# Patient Record
Sex: MALE | HISPANIC OR LATINO | Employment: OTHER | URBAN - METROPOLITAN AREA
[De-identification: names, ages, dates, MRNs, and addresses within clinical notes are randomized per-mention and may not be internally consistent; named-entity substitution may affect disease eponyms.]

---

## 2018-07-16 ENCOUNTER — HOSPITAL ENCOUNTER (INPATIENT)
Facility: CLINIC | Age: 63
LOS: 2 days | Discharge: HOME | DRG: 897 | End: 2018-07-19
Attending: EMERGENCY MEDICINE | Admitting: HOSPITALIST
Payer: COMMERCIAL

## 2018-07-16 DIAGNOSIS — I10 HYPERTENSION, UNSPECIFIED TYPE: ICD-10-CM

## 2018-07-16 DIAGNOSIS — E78.5 HYPERLIPIDEMIA, UNSPECIFIED HYPERLIPIDEMIA TYPE: Primary | ICD-10-CM

## 2018-07-16 DIAGNOSIS — F10.10 ALCOHOL ABUSE: ICD-10-CM

## 2018-07-16 DIAGNOSIS — F10.220 ALCOHOL DEPENDENCE WITH UNCOMPLICATED INTOXICATION (H): ICD-10-CM

## 2018-07-16 DIAGNOSIS — I10 BENIGN ESSENTIAL HYPERTENSION: ICD-10-CM

## 2018-07-16 LAB
ALBUMIN SERPL-MCNC: 3.4 G/DL (ref 3.4–5)
ALP SERPL-CCNC: 180 U/L (ref 40–150)
ALT SERPL W P-5'-P-CCNC: 82 U/L (ref 0–70)
ANION GAP SERPL CALCULATED.3IONS-SCNC: 7 MMOL/L (ref 3–14)
AST SERPL W P-5'-P-CCNC: 38 U/L (ref 0–45)
BASOPHILS # BLD AUTO: 0.1 10E9/L (ref 0–0.2)
BASOPHILS NFR BLD AUTO: 0.6 %
BILIRUB SERPL-MCNC: 0.2 MG/DL (ref 0.2–1.3)
BUN SERPL-MCNC: 6 MG/DL (ref 7–30)
CALCIUM SERPL-MCNC: 8.3 MG/DL (ref 8.5–10.1)
CHLORIDE SERPL-SCNC: 107 MMOL/L (ref 94–109)
CO2 SERPL-SCNC: 30 MMOL/L (ref 20–32)
CREAT SERPL-MCNC: 0.63 MG/DL (ref 0.66–1.25)
DIFFERENTIAL METHOD BLD: ABNORMAL
EOSINOPHIL # BLD AUTO: 0.4 10E9/L (ref 0–0.7)
EOSINOPHIL NFR BLD AUTO: 3.7 %
ERYTHROCYTE [DISTWIDTH] IN BLOOD BY AUTOMATED COUNT: 13.9 % (ref 10–15)
ETHANOL SERPL-MCNC: 0.25 G/DL
GFR SERPL CREATININE-BSD FRML MDRD: >90 ML/MIN/1.7M2
GLUCOSE SERPL-MCNC: 159 MG/DL (ref 70–99)
HCT VFR BLD AUTO: 44.7 % (ref 40–53)
HGB BLD-MCNC: 15.5 G/DL (ref 13.3–17.7)
IMM GRANULOCYTES # BLD: 0.1 10E9/L (ref 0–0.4)
IMM GRANULOCYTES NFR BLD: 0.5 %
INTERPRETATION ECG - MUSE: NORMAL
LYMPHOCYTES # BLD AUTO: 4.2 10E9/L (ref 0.8–5.3)
LYMPHOCYTES NFR BLD AUTO: 40.9 %
MAGNESIUM SERPL-MCNC: 2.4 MG/DL (ref 1.6–2.3)
MCH RBC QN AUTO: 33 PG (ref 26.5–33)
MCHC RBC AUTO-ENTMCNC: 34.7 G/DL (ref 31.5–36.5)
MCV RBC AUTO: 95 FL (ref 78–100)
MONOCYTES # BLD AUTO: 1.8 10E9/L (ref 0–1.3)
MONOCYTES NFR BLD AUTO: 17.2 %
NEUTROPHILS # BLD AUTO: 3.8 10E9/L (ref 1.6–8.3)
NEUTROPHILS NFR BLD AUTO: 37.1 %
NRBC # BLD AUTO: 0 10*3/UL
NRBC BLD AUTO-RTO: 0 /100
PLATELET # BLD AUTO: 186 10E9/L (ref 150–450)
POTASSIUM SERPL-SCNC: 3.5 MMOL/L (ref 3.4–5.3)
PROT SERPL-MCNC: 7.1 G/DL (ref 6.8–8.8)
RBC # BLD AUTO: 4.7 10E12/L (ref 4.4–5.9)
SODIUM SERPL-SCNC: 144 MMOL/L (ref 133–144)
TROPONIN I SERPL-MCNC: <0.015 UG/L (ref 0–0.04)
WBC # BLD AUTO: 10.3 10E9/L (ref 4–11)

## 2018-07-16 PROCEDURE — 83735 ASSAY OF MAGNESIUM: CPT | Performed by: EMERGENCY MEDICINE

## 2018-07-16 PROCEDURE — 93005 ELECTROCARDIOGRAM TRACING: CPT

## 2018-07-16 PROCEDURE — 85025 COMPLETE CBC W/AUTO DIFF WBC: CPT | Performed by: EMERGENCY MEDICINE

## 2018-07-16 PROCEDURE — 25000128 H RX IP 250 OP 636: Performed by: EMERGENCY MEDICINE

## 2018-07-16 PROCEDURE — 84484 ASSAY OF TROPONIN QUANT: CPT | Performed by: EMERGENCY MEDICINE

## 2018-07-16 PROCEDURE — 99285 EMERGENCY DEPT VISIT HI MDM: CPT

## 2018-07-16 PROCEDURE — 80053 COMPREHEN METABOLIC PANEL: CPT | Performed by: EMERGENCY MEDICINE

## 2018-07-16 PROCEDURE — 25000125 ZZHC RX 250: Performed by: EMERGENCY MEDICINE

## 2018-07-16 PROCEDURE — 80320 DRUG SCREEN QUANTALCOHOLS: CPT | Performed by: EMERGENCY MEDICINE

## 2018-07-16 RX ORDER — LORAZEPAM 2 MG/ML
1-2 INJECTION INTRAMUSCULAR
Status: ACTIVE | OUTPATIENT
Start: 2018-07-16 | End: 2018-07-17

## 2018-07-16 ASSESSMENT — ENCOUNTER SYMPTOMS
HEADACHES: 0
TREMORS: 1
SLEEP DISTURBANCE: 1
HALLUCINATIONS: 1
SHORTNESS OF BREATH: 0

## 2018-07-16 NOTE — IP AVS SNAPSHOT
MRN:[id number]                      After Visit Summary   7/16/2018    Javier Gaston    MRN: [id number]           Thank you!     Thank you for choosing [facility] for your care. Our goal is always to provide you with excellent care. Hearing back from our patients is one way we can continue to improve our services. Please take a few minutes to complete the written survey that you may receive in the mail after you visit with us. Thank you!        Patient Information     Date Of Birth          1955        Designated Caregiver       Most Recent Value    Caregiver    Will someone help with your care after discharge? yes    Name of designated caregiver Geronimo Diehl, daughter in law    Phone number of caregiver [phone]    Caregiver address Same as patient      About your hospital stay     You were admitted on:  July 17, 2018 You last received care in the:  [facility] [address] [facility]    You were discharged on:  July 19, 2018        Reason for your hospital stay       Alcohol withdraw                  Who to Call     For medical emergencies, please call 911.  For non-urgent questions about your medical care, please call your primary care provider or clinic, [phone]          Attending Provider     Provider Specialty    Alejandro Coronel MD Emergency Medicine    Ryan Junior MD Emergency Medicine    MarycruzMichael allan,  HOSPITALIST       Primary Care Provider Office Phone # Fax #    Viry Cline [phone]      After Care Instructions     Activity       Your activity upon discharge: activity as tolerated            Diet       Follow this diet upon discharge: Moderate consistent carbohydrate (9584-4956 hung / 4-6 CHO units per meal)                  Follow-up Appointments     Follow-up and recommended labs and tests        Follow up with primary care provider, Viry Cline, within 7 days to evaluate medication change, for hospital follow-  "up, regarding new diagnosis (DM type 2) and to follow up on BP and results.  The following labs/tests are recommended: glucose, liver enzymes.                  Further instructions from your care team       Patient belongings kept with patient    Appt scheduled with Dr. Cline on  at 10:20 am.  At [facility]     Pending Results     No orders found from 2018 to 2018.            Statement of Approval     Ordered          18 0854  I have reviewed and agree with all the recommendations and orders detailed in this document.  EFFECTIVE NOW     Approved and electronically signed by:  Adebayo Elizondo MD             Admission Information     Date & Time Provider Department Dept. Phone    2018 Michael Hough DO [place] [address] [facility] [phone]      Your Vitals Were     Blood Pressure Temperature Respirations Height Weight Pulse Oximetry    173/106 (BP Location: Left arm) 97.8  F (36.6  C) (Oral) 16 1.676 m (5' 6\") 79.4 kg (175 lb) 96%    BMI (Body Mass Index)                   28.25 kg/m2           [employer] Information     [employer] lets you send messages to your doctor, view your test results, renew your prescriptions, schedule appointments and more. To sign up, go to [email or web address] . Click on \"Log in\" on the left side of the screen, which will take you to the Welcome page. Then click on \"Sign up Now\" on the right side of the page.     You will be asked to enter the access code listed below, as well as some personal information. Please follow the directions to create your username and password.     Your access code is: [email or web address]  Expires: 10/17/2018  9:22 AM     Your access code will  in 90 days. If you need help or a new code, please call your [facility] or [phone].        Care EveryWhere ID     This is your Care EveryWhere ID. This could be used by other organizations to access your [facility] " medical records  [id number]        Equal Access to Services     JAE WILHELM : Hadii aad ku hadrayguillermina Schulz, walesterda luqadaha, qaybta johndebraruss hernadez, matt nodenypro hills. So [facility] [phone].    ATENCIÓN: Si habla español, tiene a rea disposición servicios gratuitos de asistencia lingüística. Llame al [phone].    We comply with applicable federal civil rights laws and [place] laws. We do not discriminate on the basis of race, color, national origin, age, disability, sex, sexual orientation, or gender identity.               Review of your medicines      START taking        Dose / Directions    folic acid 1 MG tablet   Commonly known as:  FOLVITE   Used for:  Alcohol abuse        Dose:  1 mg   Take 1 tablet (1 mg) by mouth daily   Quantity:  30 tablet   Refills:  0       lisinopril 10 MG tablet   Commonly known as:  PRINIVIL/ZESTRIL   Used for:  Benign essential hypertension        Dose:  10 mg   Take 1 tablet (10 mg) by mouth daily   Quantity:  30 tablet   Refills:  3       multivitamin, therapeutic with minerals Tabs tablet   Used for:  Alcohol abuse        Dose:  1 tablet   Take 1 tablet by mouth daily   Quantity:  30 each   Refills:  0       rosuvastatin 5 MG tablet   Commonly known as:  CRESTOR   Used for:  Hyperlipidemia, unspecified hyperlipidemia type        Dose:  5 mg   Take 1 tablet (5 mg) by mouth daily   Quantity:  30 tablet   Refills:  3       thiamine 100 MG tablet   Used for:  Alcohol abuse        Dose:  100 mg   Take 1 tablet (100 mg) by mouth daily for 3 days   Quantity:  3 tablet   Refills:  0         CONTINUE these medicines which may have CHANGED, or have new prescriptions. If we are uncertain of the size of tablets/capsules you have at home, strength may be listed as something that might have changed.        Dose / Directions    metoprolol succinate 50 MG 24 hr tablet   Commonly known as:  TOPROL-XL   This may have changed:    - medication strength  - how much  to take   Used for:  Alcohol abuse        Dose:  50 mg   Take 1 tablet (50 mg) by mouth daily   Quantity:  30 tablet   Refills:  3         CONTINUE these medicines which have NOT CHANGED        Dose / Directions    HYDROCHLOROTHIAZIDE PO        Dose:  25 mg   Take 25 mg by mouth daily   Refills:  0            Where to get your medicines      These medications were sent to [facility] [place], [place] - [address]  [address] [place] [address]     Phone:  [phone]     folic acid 1 MG tablet    lisinopril 10 MG tablet    metoprolol succinate 50 MG 24 hr tablet    multivitamin, therapeutic with minerals Tabs tablet    rosuvastatin 5 MG tablet    thiamine 100 MG tablet                Protect others around you: Learn how to safely use, store and throw away your medicines at www.disposemymeds.org.             Medication List: This is a list of all your medications and when to take them. Check marks below indicate your daily home schedule. Keep this list as a reference.      Medications           Morning Afternoon Evening Bedtime As Needed    folic acid 1 MG tablet   Commonly known as:  FOLVITE   Take 1 tablet (1 mg) by mouth daily   Last time this was given:  1 mg on 7/19/2018  9:13 AM            Tomorrow morning 7/20                       HYDROCHLOROTHIAZIDE PO   Take 25 mg by mouth daily   Last time this was given:  25 mg on 7/19/2018  9:13 AM            Tomorrow morning 7/20                       lisinopril 10 MG tablet   Commonly known as:  PRINIVIL/ZESTRIL   Take 1 tablet (10 mg) by mouth daily   Last time this was given:  10 mg on 7/19/2018  9:13 AM            Tomorrow morning 7/20                       metoprolol succinate 50 MG 24 hr tablet   Commonly known as:  TOPROL-XL   Take 1 tablet (50 mg) by mouth daily   Last time this was given:  50 mg on 7/19/2018  9:13 AM            Tomorrow morning 7/20                       multivitamin, therapeutic with minerals Tabs tablet   Take  1 tablet by mouth daily   Last time this was given:  1 tablet on 7/19/2018  9:13 AM            Tomorrow morning 7/20                       rosuvastatin 5 MG tablet   Commonly known as:  CRESTOR   Take 1 tablet (5 mg) by mouth daily   Last time this was given:  5 mg on 7/19/2018  9:13 AM            Tomorrow morning 7/20                       thiamine 100 MG tablet   Take 1 tablet (100 mg) by mouth daily for 3 days   Last time this was given:  100 mg on 7/19/2018  9:13 AM         Tomorrow morning 7/20                                 More Information        Síndrome De Abstinencia Del Alcohol [Alcohol Withdrawl]  Los síntomas de la abstinencia del alcohol aparecen si usted ha bebido con mucha frecuencia en los últimos bina y rea cuerpo se jamil acostumbrado a los efectos del alcohol. Cuando diane de beber de repente (o, si reduce la cantidad de alcohol que samuel por día, aunque continúe bebiendo), es posible que presente los síntomas de la abstinencia del alcohol, también llamados  la temblorina  (temblores navid) o  delirium tremens.   Los síntomas usuales chris de 3 a 4 días y pueden incluir nerviosismo, temblores, náuseas, sudor o insomnio (no poder dormir). En casos graves, puede arslan alucinaciones (debra cosas que no están allí) y convulsiones.  Cuidados En [place]:    En los próximos días, deberá descansar mucho y beber mucho líquido. Coma a intervalos regulares. Por supuesto, no debra más alcohol. Lo mejor tosha tunde período es que usted permanezca con rea krissy o con amigos que puedan ayudarlo y contenerlo. También puede inscribirse en algún programa residencial de desintoxicación.    No conduzca hasta que todos los síntomas hayan desaparecido y se sienta mejor.    Si le dieron algún medicamento sedante, no lo tome con mayor frecuencia que la recetada y nunca lo tome con alcohol.  Visita De Control:  Sakina vez que haya pasado por los síntomas de abstinencia, habrá luchado la mitad de la jeffery. Para evitar recaer  en rea jer manera de beber, es esencial que obtenga tratamiento, respaldo y contención tosha esta nueva etapa.  Alcohólicos Anónimos (Alcoholics Anonymous) ofrece contención a través de grupos de autoayuda. No se cobran cuotas ni honorarios. Consulte las [place] y llame para conocer el horario y el lugar donde se realizan las reuniones. www.aa.org  Al-Anon ofrece apoyo para alcohólicos y familiares de alcohólicos. [phone] www.al-anon.org  National [place] on Alcoholism and Drug Dependence (Consejo Nacional sobre Alcoholismo y Drogadependencia) [phone] www.ncadd.org  Existen también programas residenciales de desintoxicación alcohólica. Consulte las [place], en la sección  Drug Abuse & Treatment Centers  (Drogadicción y Centros de Tratamiento).  Busque Prontamente Atención Médica  si algo de lo siguiente ocurre:    Temblores navid.    Alucinaciones.    Convulsiones.    Fiebre superior a los 100.5  F (38.0  C) [temperatura oral].    Dolor de bindu, confusión, somnolencia extrema, no se lo puede despertar.    Mayor dolor en la parte superior del abdomen.    Vómito persistente, o presencia de collin en el vómito.  Date Last Reviewed: 1/11/2016 2000-2017 [employer]. [address] [place] [address], [place] [address]. Todos los derechos reservados. Esta información no pretende sustituir la atención médica profesional. Sólo rea médico puede diagnosticar y tratar un problema de vishal.                Instrucciones de tyrone: Cómo tomarse la presión arterial  La presión arterial es la fuerza que la collin ejerce al moverse desde el corazón a través de los vasos sanguíneos. Usted puede tomarse rea propia presión arterial utilizando un monitor digital. Tómese la presión tan a menudo bisi le indique el médico. Tómesela siempre de la misma forma y en el mismo brazo.    1. Relájese    Espere al menos media hora después de fumar, comer o hacer ejercicio.    Siéntese cómodamente ante alex  lilly y coloque el monitor frente a usted.    Descanse tosha unos minutos antes de continuar.    2. Envuelva el manguito    Coloque el brazo sobre la lilly con la arshad de la mano hacia arriba. Lowery brazo debe estar al nivel del corazón. Envuelva el manguito alrededor del brazo, jacque arriba del codo. Es mejor que envuelva el manguito directamente sobre la piel, sin ropa por encima.    Asegúrese de que el manguito sea del tamaño adecuado. Si no alcanza para envolverlo alrededor del brazo, pida liana más mckinley. Un manguito demasiado pequeño puede producir alex medición incorrecta de la presión arterial.    3. Infle el manguito     Bombee el manguito hasta que el medidor de presión inder 160. Si tiene un manguito autoinflable, presione el botón que pone en funcionamiento a la bomba.    El manguito se apretará y luego se aflojará.    Los números empezarán a cambiar. Cuando dejen de cambiar, aparecerá la medición de lowery presión arterial.    Si obtiene alex medición demasiado tyrone o demasiado baja con respecto a la habitual, relájese tosha unos minutos y citlalli la prueba otra vez.    4. Anote los resultados    Anote los resultados que obtiene al medirse la presión arterial. Inder la fecha y la hora y mantenga los resultados en un solo lugar, por ejemplo en un cuaderno.    Quite el manguito del brazo y desconecte la máquina.  Date Last Reviewed: 4/27/2016 2000-2017 [employer]. [address] [place] [address], [place] [address]. Todos los derechos reservados. Esta información no pretende sustituir la atención médica profesional. Sólo lowery médico puede diagnosticar y tratar un problema de vishal.                Diabetes (Información general)  La diabetes es un problema de vishal a estee plazo que significa que lowery cuerpo no produce la suficiente insulina. O también puede significar que lowery cuerpo no puede utilizar la insulina que produce. La insulina es alex hormona en lowery organismo, que permite que el azúcar en la collin  (glucosa) llegue a las células en rea cuerpo. Todas jaquan células necesitan glucosa bisi combustible.  Cuando usted tiene diabetes la glucosa en rea cuerpo se acumula porque no puede llegar hasta las células. Esta acumulación se conoce bisi un nivel alto de azúcar en la collin (hiperglucemia).  Rea nivel de azúcar en la collin depende de varias cosas. Depende de la clase de alimentos que usted come y de cuánto come. También depende de cuánto ejercicio hace y de cuánta insulina tiene en rea organismo. [place] mucho de las clases indebidas de alimentos o no hyun a tiempo los medicamentos para la diabetes puede causar con el tiempo un nivel alto de azúcar en la collin. Las infecciones pueden provocar un nivel alto de azúcar en la collin, incluso si está tomando jaquan medicamentos correctamente.  Estas cosas también pueden causar un nivel bajo de azúcar en la collin:    Saltarse las comidas    No comer suficientes alimentos    Hyun demasiado de un medicamento para la diabetes  Con el tiempo, si no se trata la diabetes puede causar problemas serios. Estos problemas incluyen enfermedades del corazón, ataque cerebral, insuficiencia renal y ceguera. También pueden incluir dolor en los nervios o pérdida de sensación en jaquan piernas o pies. Al mantener rea azúcar en la collin bajo control usted puede prevenir o retrasar estos problemas.  Los niveles normales en la collin son de 80 a 130 antes de las comidas y menos de 180 de 1 a 2 horas después de comer.    Cuidados en el hogar  Cuando se esté cuidando usted mismo en rea hogar, siga estas pautas:    Siga la dieta que le recomiende rea proveedor de atención médica.  Use la insulina o cualquier otro medicamento para la diabetes, exactamente bisi se lo indiquen.    Vigile jaquan niveles de azúcar en la collin bisi le indiquen. Lleve un registro con los resultados. [place] le ayudará a rea proveedor a cambiar jaquan medicamentos para mantener el azúcar en la collin bajo control.     Trate de alcanzar  rea peso ideal. Es posible que pueda reducir o dejar de paige medicamentos para la diabetes si come los alimentos adecuados y hace ejercicio.    No fume. Fumar hace que los efectos de la diabetes empeoren en rea circulación. Si usted fuma y tiene diabetes es mucho más probable que sufra un ataque al corazón.     Cuídese sergio jaquan pies. Si jamil perdido la sensación en jaquan pies, es posible que no patricia alex herida o alex infección. Revise jaquan pies y la gladys entre los dedos por lo menos alex vez a la semana.      Utilice rea brazalete de alerta médica o lleve alex tarjeta en rea billetera que diga que usted tiene diabetes. [place] les ayudará a los proveedores de atención médica a brindarle los cuidados adecuados si usted se enferma hasta el punto que no pueda decirles que tiene diabetes.  Plan para un día de enfermedad  Si usted contrae un resfriado, la gripe o alex infección viral, siga estos pasos:    Revise rea plan para un día de enfermedad de la diabetes y llame a rea proveedor de atención médica bisi le indicaron que lo hiciera. Es posible que deba llamar al proveedor de inmediato si:  ? Rea nivel de azúcar en la collin está por encima de 240 a pesar de estar tomando los medicamentos para la diabetes  ? Los niveles de cetonas en rea orina están por encima de lo normal o altos  ? Ha estado vomitando tosha más de 6 horas  ? Tiene dificultades para respirar  ? Tiene alex fiebre tyrone  ? Tiene fiebre tosha varios días y no mejora  ? Se siente más aturdido o somnoliento de lo usual    Siga tomando jaquan pastillas para la diabetes (medicamentos orales) incluso si ha estado vomitando y se siente muy enfermo. Llame a rea proveedor de inmediato porque es posible que necesite insulina para bajar los niveles de azúcar en la collin hasta que se recupere de rea enfermedad.      Monitoree rea insulina incluso si ha estado vomitando y se siente muy enfermo. Llame a rea proveedor de inmediato y pregunte si necesita cambiar rea dosis de insulina. [place]  dependerá de los resultados de jaquan niveles de azúcar en la collin.     Revise rea nivel de azúcar en la collin cada 2 a 4 horas, o por lo menos 4 veces al día.    Revise con frecuencia jaquan cetonas. Si está vomitando y tiene diarrea, revíselas con más frecuencia.    No se salte comidas. Trate de comer comidas pequeñas a intervalos regulares. Hágalo aunque no sienta gana de comer.    Debra agua y otros líquidos que no contengan cafeína o calorías. [place] prevendrá que se deshidrate. Si tiene náuseas o vómito, tome pequeños sorbos cada 5 minutos. Para prevenir la deshidratación trate de beber alex taza (8 onzas) de líquido cada hora mientras esté despierto.  Cuidados generales  Siempre lleve con usted alex xiomara de azúcar de acción rápida para el charisma de que tenga síntomas de un nivel bajo de azúcar en la collin (menos de 70). A las primeras señales de un nivel bajo de azúcar en la collin, coma o debra de 15 a 20 gramos de azúcar de acción rápida para elevar el nivel de azúcar en la collin. Algunos ejemplos son:      3 a 4 tabletas de glucosa. Estas pueden comprarse en la mayoría de las farmacias.    4 onzas (1/2 taza) de un refresco (que no sea de dieta)    4 onzas (1/2 taza) de cualquier jugo de fruta    8 onzas (1 taza) de leche    5 a 6 unidades de caramelos duros    1 cucharada de miel  Revise rea nivel de azúcar en la collin 15 minutos después de tratarse. Si sigue por debajo de 70, tome de 15 a 20 gramos más de azúcar de acción rápida. Vuelva a revisarse en 15 minutos. Si regresa a lo normal (70 o más), coma un bocadillo o alex comida para mantener el azúcar en la collin dentro de un nivel seguro. Si permanece bajo, llame a rea médico o vaya a alex ashok de emergencias.  Cuidado de seguimiento  Rachelle alex teresa de seguimiento con rea proveedor de atención médica, o bisi le indiquen. Para más información acerca de la diabetes, visite la página web de la Asociación Americana de la Diabetes (American Diabetes Association) en  www.diabetes.org o llame al 870-052-6896.  Cuándo buscar consejo médico  Llame a rea proveedor de atención médica de inmediato si tiene cualquiera de estos síntomas de un nivel alto de azúcar en la collin:      Orina frecuentemente    Mareos    Somnolencia    Sed    Dolor de bindu    Náuseas o vómito    Dolor abdominal    Cambios en la vista    Respiración rápida    Confusión o pérdida del conocimiento  También llame a rea proveedor de inmediato si tiene alguna de estas señales de un nivel bajo de azúcar en la collin:      Fatiga    Dolor de bindu    Temblores    Sudoración excesiva    Hambre    Se siente ansioso o inquieto    Cambios en la vista    Somnolencia    Debilidad    Confusión o pérdida del conocimiento  Llame a rea proveedor de inmediato si cualquiera de estos ocurre:    Dolor en el pecho o falta de aire    Mareos o desmayos    Debilidad en un brazo o alex pierna, o en un lado de la sergey    Dificultad para hablar o para debra   Date Last Reviewed: 6/1/2016 2000-2017 [employer]. [address] [place] [id number]. Todos los derechos reservados. Esta información no pretende sustituir la atención médica profesional. Sólo rea médico puede diagnosticar y tratar un problema de vishal.

## 2018-07-16 NOTE — IP AVS SNAPSHOT
[facility] [address] [facility]    [address] [place] [address]    Phone:  [phone]                                       After Visit Summary   7/16/2018    Javier Gaston    MRN: [id number]           After Visit Summary Signature Page     I have received my discharge instructions, and my questions have been answered. I have discussed any challenges I see with this plan with the nurse or doctor.    ..........................................................................................................................................  Patient/Patient Representative Signature      ..........................................................................................................................................  Patient Representative Print Name and Relationship to Patient    ..................................................               ................................................  Date                                            Time    ..........................................................................................................................................  Reviewed by Signature/Title    ...................................................              ..............................................  Date                                                            Time

## 2018-07-17 PROBLEM — F10.939 ALCOHOL WITHDRAWAL (H): Status: ACTIVE | Noted: 2018-07-17

## 2018-07-17 LAB
ALBUMIN UR-MCNC: NEGATIVE MG/DL
AMPHETAMINES UR QL SCN: NEGATIVE
APPEARANCE UR: CLEAR
BARBITURATES UR QL: NEGATIVE
BENZODIAZ UR QL: NEGATIVE
BILIRUB UR QL STRIP: NEGATIVE
CANNABINOIDS UR QL SCN: NEGATIVE
COCAINE UR QL: NEGATIVE
COLOR UR AUTO: NORMAL
GLUCOSE UR STRIP-MCNC: NEGATIVE MG/DL
HGB UR QL STRIP: NEGATIVE
KETONES UR STRIP-MCNC: NEGATIVE MG/DL
LEUKOCYTE ESTERASE UR QL STRIP: NEGATIVE
NITRATE UR QL: NEGATIVE
OPIATES UR QL SCN: NEGATIVE
PCP UR QL SCN: NEGATIVE
PH UR STRIP: 6.5 PH (ref 5–7)
RBC #/AREA URNS AUTO: <1 /HPF (ref 0–2)
SOURCE: NORMAL
SP GR UR STRIP: 1 (ref 1–1.03)
UROBILINOGEN UR STRIP-MCNC: NORMAL MG/DL (ref 0–2)
WBC #/AREA URNS AUTO: <1 /HPF (ref 0–5)

## 2018-07-17 PROCEDURE — 12000000 ZZH R&B MED SURG/OB

## 2018-07-17 PROCEDURE — HZ2ZZZZ DETOXIFICATION SERVICES FOR SUBSTANCE ABUSE TREATMENT: ICD-10-PCS | Performed by: HOSPITALIST

## 2018-07-17 PROCEDURE — 25000132 ZZH RX MED GY IP 250 OP 250 PS 637: Performed by: HOSPITALIST

## 2018-07-17 PROCEDURE — 81001 URINALYSIS AUTO W/SCOPE: CPT | Performed by: EMERGENCY MEDICINE

## 2018-07-17 PROCEDURE — 80307 DRUG TEST PRSMV CHEM ANLYZR: CPT | Performed by: EMERGENCY MEDICINE

## 2018-07-17 PROCEDURE — 99222 1ST HOSP IP/OBS MODERATE 55: CPT | Mod: AI | Performed by: HOSPITALIST

## 2018-07-17 PROCEDURE — 99207 ZZC APP CREDIT; MD BILLING SHARED VISIT: CPT | Performed by: HOSPITALIST

## 2018-07-17 PROCEDURE — 25000128 H RX IP 250 OP 636: Performed by: HOSPITALIST

## 2018-07-17 PROCEDURE — 99222 1ST HOSP IP/OBS MODERATE 55: CPT | Performed by: PSYCHIATRY & NEUROLOGY

## 2018-07-17 RX ORDER — POTASSIUM CL/LIDO/0.9 % NACL 10MEQ/0.1L
10 INTRAVENOUS SOLUTION, PIGGYBACK (ML) INTRAVENOUS
Status: DISCONTINUED | OUTPATIENT
Start: 2018-07-17 | End: 2018-07-19

## 2018-07-17 RX ORDER — SODIUM CHLORIDE 9 MG/ML
INJECTION, SOLUTION INTRAVENOUS CONTINUOUS
Status: DISCONTINUED | OUTPATIENT
Start: 2018-07-17 | End: 2018-07-18

## 2018-07-17 RX ORDER — LANOLIN ALCOHOL/MO/W.PET/CERES
100 CREAM (GRAM) TOPICAL DAILY
Status: DISCONTINUED | OUTPATIENT
Start: 2018-07-17 | End: 2018-07-19 | Stop reason: HOSPADM

## 2018-07-17 RX ORDER — HYDROCHLOROTHIAZIDE 25 MG/1
25 TABLET ORAL DAILY
Status: DISCONTINUED | OUTPATIENT
Start: 2018-07-18 | End: 2018-07-17

## 2018-07-17 RX ORDER — METOPROLOL SUCCINATE 25 MG/1
25 TABLET, EXTENDED RELEASE ORAL DAILY
Status: DISCONTINUED | OUTPATIENT
Start: 2018-07-17 | End: 2018-07-18

## 2018-07-17 RX ORDER — POTASSIUM CHLORIDE 7.45 MG/ML
10 INJECTION INTRAVENOUS
Status: DISCONTINUED | OUTPATIENT
Start: 2018-07-17 | End: 2018-07-19

## 2018-07-17 RX ORDER — HYDRALAZINE HYDROCHLORIDE 20 MG/ML
10 INJECTION INTRAMUSCULAR; INTRAVENOUS EVERY 4 HOURS PRN
Status: DISCONTINUED | OUTPATIENT
Start: 2018-07-17 | End: 2018-07-19

## 2018-07-17 RX ORDER — ROSUVASTATIN CALCIUM 5 MG/1
5 TABLET, COATED ORAL DAILY
Status: DISCONTINUED | OUTPATIENT
Start: 2018-07-17 | End: 2018-07-19 | Stop reason: HOSPADM

## 2018-07-17 RX ORDER — MAGNESIUM SULFATE HEPTAHYDRATE 40 MG/ML
4 INJECTION, SOLUTION INTRAVENOUS EVERY 4 HOURS PRN
Status: DISCONTINUED | OUTPATIENT
Start: 2018-07-17 | End: 2018-07-19

## 2018-07-17 RX ORDER — POTASSIUM CHLORIDE 1500 MG/1
20-40 TABLET, EXTENDED RELEASE ORAL
Status: DISCONTINUED | OUTPATIENT
Start: 2018-07-17 | End: 2018-07-19

## 2018-07-17 RX ORDER — AMOXICILLIN 250 MG
1 CAPSULE ORAL 2 TIMES DAILY
Status: DISCONTINUED | OUTPATIENT
Start: 2018-07-17 | End: 2018-07-19

## 2018-07-17 RX ORDER — ACETAMINOPHEN 325 MG/1
650 TABLET ORAL EVERY 4 HOURS PRN
Status: DISCONTINUED | OUTPATIENT
Start: 2018-07-17 | End: 2018-07-19

## 2018-07-17 RX ORDER — POTASSIUM CHLORIDE 29.8 MG/ML
20 INJECTION INTRAVENOUS
Status: DISCONTINUED | OUTPATIENT
Start: 2018-07-17 | End: 2018-07-19

## 2018-07-17 RX ORDER — HYDROCHLOROTHIAZIDE 25 MG/1
25 TABLET ORAL DAILY
Status: DISCONTINUED | OUTPATIENT
Start: 2018-07-17 | End: 2018-07-19 | Stop reason: HOSPADM

## 2018-07-17 RX ORDER — MULTIPLE VITAMINS W/ MINERALS TAB 9MG-400MCG
1 TAB ORAL DAILY
Status: DISCONTINUED | OUTPATIENT
Start: 2018-07-17 | End: 2018-07-19 | Stop reason: HOSPADM

## 2018-07-17 RX ORDER — POTASSIUM CHLORIDE 1.5 G/1.58G
20-40 POWDER, FOR SOLUTION ORAL
Status: DISCONTINUED | OUTPATIENT
Start: 2018-07-17 | End: 2018-07-19

## 2018-07-17 RX ORDER — NALOXONE HYDROCHLORIDE 0.4 MG/ML
.1-.4 INJECTION, SOLUTION INTRAMUSCULAR; INTRAVENOUS; SUBCUTANEOUS
Status: DISCONTINUED | OUTPATIENT
Start: 2018-07-17 | End: 2018-07-19

## 2018-07-17 RX ORDER — AMOXICILLIN 250 MG
2 CAPSULE ORAL 2 TIMES DAILY
Status: DISCONTINUED | OUTPATIENT
Start: 2018-07-17 | End: 2018-07-19

## 2018-07-17 RX ORDER — DIAZEPAM 5 MG
10 TABLET ORAL EVERY 30 MIN PRN
Status: DISCONTINUED | OUTPATIENT
Start: 2018-07-17 | End: 2018-07-19

## 2018-07-17 RX ORDER — FOLIC ACID 1 MG/1
1 TABLET ORAL DAILY
Status: DISCONTINUED | OUTPATIENT
Start: 2018-07-17 | End: 2018-07-19 | Stop reason: HOSPADM

## 2018-07-17 RX ORDER — DIAZEPAM 10 MG/2ML
5-10 INJECTION, SOLUTION INTRAMUSCULAR; INTRAVENOUS EVERY 30 MIN PRN
Status: DISCONTINUED | OUTPATIENT
Start: 2018-07-17 | End: 2018-07-19

## 2018-07-17 RX ADMIN — SODIUM CHLORIDE: 9 INJECTION, SOLUTION INTRAVENOUS at 06:44

## 2018-07-17 RX ADMIN — Medication 100 MG: at 09:12

## 2018-07-17 RX ADMIN — METOPROLOL SUCCINATE 25 MG: 25 TABLET, EXTENDED RELEASE ORAL at 09:12

## 2018-07-17 RX ADMIN — HYDROCHLOROTHIAZIDE 25 MG: 25 TABLET ORAL at 09:11

## 2018-07-17 RX ADMIN — ROSUVASTATIN CALCIUM 5 MG: 5 TABLET, FILM COATED ORAL at 09:12

## 2018-07-17 RX ADMIN — SODIUM CHLORIDE: 9 INJECTION, SOLUTION INTRAVENOUS at 16:42

## 2018-07-17 RX ADMIN — MULTIPLE VITAMINS W/ MINERALS TAB 1 TABLET: TAB at 09:12

## 2018-07-17 RX ADMIN — FOLIC ACID 1 MG: 1 TABLET ORAL at 09:12

## 2018-07-17 ASSESSMENT — ACTIVITIES OF DAILY LIVING (ADL)
ADLS_ACUITY_SCORE: 11

## 2018-07-17 NOTE — PLAN OF CARE
Problem: Alcohol Withdrawal Acute, Risk/Actual (Adult)  Goal: Signs and Symptoms of Listed Potential Problems Will be Absent, Minimized or Managed (Alcohol Withdrawal Acute, Risk/Actual)  Signs and symptoms of listed potential problems will be absent, minimized or managed by discharge/transition of care (reference Alcohol Withdrawal Acute, Risk/Actual (Adult) CPG).   Outcome: No Change  Pt is A&Ox4, Greek speaking, Daughter at bedside,  here earlier for psych eval. VSS on RA, denied pain, Hypertensive, MD aware, Restarted PTA medications although last /101. Up with SBA. LS clear, adequate UOP. CIWA 0/0. Poor appetite, denied N/V. IVF infusing at 100ml/hr. D/c 1-2 days.

## 2018-07-17 NOTE — ED PROVIDER NOTES
History     Chief Complaint:  Alcohol Intoxication and Hypertension    The history is provided by the patient, a relative and medical records.      Javier Gaston is a 63 year old male with hypercholesteremia and a history of alcohol abuse who presents to the emergency department today for evaluation of alcohol intoxication and hypertension. The patient was in clinic 4 days ago for tests. Per the family, the patient was called by his nurse today and told that his blood pressure and cholesterol was running high, he has not been taking his medication and has been drinking a lot the past three days. He denies any need for help with his alcohol use, but his family states that he does need it. He reports that he gets tremors and hallucinations of people and animals attacking him after getting sober after a binge, and that he loses sleep secondary to this. He denies any pain, trouble breathing, headache, and vision changes. The patient last had an alcoholic drink at 2100 this evening.     Allergies:  Tree Nuts [Nuts]    Medications:    ADVIL OR  NAPROSYN 500 MG OR TABS  SKELAXIN 800 MG OR TABS  VICODIN 5-500 MG OR TABS    Past Medical History:    Hypercholesteremia    Past Surgical History:    History reviewed. No pertinent surgical history.     Family History:    History reviewed. No pertinent family history.    Social History:  The patient was accompanied to the ED by his daughter.  Smoking Status: Current Smoker  Smokeless Tobacco: Never Used  Alcohol Use: Positive   Marital Status:       Review of Systems   Eyes: Negative for visual disturbance.   Respiratory: Negative for shortness of breath.    Neurological: Positive for tremors. Negative for headaches.   Psychiatric/Behavioral: Positive for hallucinations and sleep disturbance.   All other systems reviewed and are negative.    Physical Exam     Patient Vitals for the past 24 hrs:   BP Temp Temp src Heart Rate Resp SpO2 Height Weight  "  07/16/18 2345 (!) 174/100 - - - - 95 % - -   07/16/18 2315 (!) 149/94 - - - - 95 % - -   07/16/18 2300 (!) 157/107 - - - - 97 % - -   07/16/18 2230 (!) 164/100 - - - - 96 % - -   07/16/18 2215 155/90 - - - - 96 % - -   07/16/18 2109 (!) 169/92 98.1  F (36.7  C) Oral 94 16 96 % 1.676 m (5' 6\") 79.4 kg (175 lb)      Physical Exam    Constitutional:  Appears well-developed and well-nourished. Cooperative.   Smells of alcohol, pleasant, appears mildly intoxicated, somewhat anxious.  HENT:   Head:    Atraumatic.   Mouth/Throat:   Oropharynx is without erythema or exudate and mucous     membranes are moist.   Mild tremor when protruding tongue.  Eyes:    Conjunctivae normal and EOM are normal.      Pupils are equal, round, and reactive to light.   Neck:    Normal range of motion. Neck supple.   Cardiovascular:  Normal rate, regular rhythm, normal heart sounds and radial and    dorsalis pedis pulses are 2+ and symmetric.    Pulmonary/Chest:  Effort normal and breath sounds normal.   Abdominal:   Soft. Bowel sounds are normal.      No splenomegaly or hepatomegaly. No tenderness. No rebound.   Musculoskeletal:  Normal range of motion. No edema and no tenderness.   Neurological:  Alert. Normal strength. No cranial nerve deficit. GCS 15.  Mild tremor with activity in both hands.  Skin:    Skin is warm and dry.   Psychiatric:   Normal mood and affect.     Emergency Department Course     ECG:  ECG taken at 2224, ECG read at 2250  Normal sinus rhythm  Nonspecific ST abnormality  Abnormal ECG  Rate slowed and now with NST wave changes since prior ECG on 12/31/10  Rate 85 bpm. IN interval 152 ms. QRS duration 98 ms. QT/QTc 374/445 ms. P-R-T axes 77 19 6.    Laboratory:  Laboratory findings were communicated with the patient who voiced understanding of the findings.    UA: Negative  Drug abuse: Negative  CBC: WBC 10.3, HGB 15.5,   CMP: Glucose 159, BUN 6, Creatinine 0.63, Calcium 8.3, AlkPhos 180, ALT 82  Troponin I: " <0.015  ETOH: 0.25  Magnesium: 2.4    Interventions:  2327 NS, 1 L with Infuvite 10 mL, thiamine 100 mg, and folic acid 1 mg, IV     Emergency Department Course:    2205 Nursing notes and vitals reviewed.    2210 I performed an exam of the patient as documented above.     2252 IV was inserted and blood was drawn for laboratory testing, results above.     0010 I personally reviewed the lab results with the patient and answered all related questions prior to admission.    0040 The patient provided a urine sample here in the emergency department. This was sent for laboratory testing, findings above.     0120 Patient was rechecked and updated.     Impression & Plan      Medical Decision Making:  Javier Gaston is a 63 year old male who presents for evaluation of alcohol abuse and intoxication.  They are intoxicated here in ED by lab evaluation.   Patient has a self reported history of Delirium tremens and hallucinations with alcohol withdrawal. There are no signs of co-ingestion including acetaminophen, drugs, medications, volatile alcohols.  There are no signs of trauma related to alcohol use and no further workup is needed including head CT. Thiamine/folate/multivitamin given.  There are no detox beds available in the city.  Based on history, the patient is at risk for significant withdrawal.  I recommended inpatient detox, and the patient and his family are agreeable to this.    The patient also has recently been evaluated by his primary care physician.  They alerted him that his cholesterol is elevated, and he needs to begin treatment.  Patient also has had poorly controlled hypertension.  He is asymptomatic with his elevated pressures here in the ED. There is no headache and no neurologic findings.  No indication for head CT.  There is no sign of endorgan damage another testing.  Blood pressures improved somewhat with rest and hydration in the ED. I discussed the case with hospitalist who agreed to  accept for admission.  Patient will be admitted for further treatment.    Diagnosis:    ICD-10-CM    1. Alcohol abuse F10.10 UA with Microscopic     Drug abuse screen 77 urine ([place], [place], )   2. Alcohol dependence with uncomplicated intoxication (H) F10.220    3. Hypertension, unspecified type I10      Disposition:   Admission    Scribe Disclosure:  Tavares DANIEL, am serving as a scribe at 10:02 PM on 7/16/2018 to document services personally performed by Ryan Junior MD based on my observations and the provider's statements to me.       EMERGENCY DEPARTMENT       Ryan Junior MD  07/17/18 9863

## 2018-07-17 NOTE — PROGRESS NOTES
Admission    Patient arrives to room 614-1 via cart from ED.  Care plan note: Yes    Inpatient nursing criteria listed below were met:    PCD's Documented: Yes  Skin issues/needs documented :No  Isolation education started/completed NA  Patient allergies verified with patient: No  Verified completion of [employer] Risk Assessment Tool:  Yes  Verified completion of Guardianship screening tool: Yes  Fall Prevention: Care plan updated, Education given and documented Yes  Care Plan initiated: Yes  Home medications documented in belongings flowsheet: NA  Patient belongings documented in belongings flowsheet: Yes  Reminder note (belongings/ medications) placed in discharge instructions:Yes  Admission profile/ required documentation complete: Yes

## 2018-07-17 NOTE — PHARMACY-ADMISSION MEDICATION HISTORY
Admission medication history interview status for the 7/16/2018  admission is complete. See EPIC admission navigator for prior to admission medications     Medication history source reliability:Good    Actions taken by pharmacist (provider contacted, etc):Spoke to patient and daughter regarding current medications and history. Daughter has photos of medication bottles on her phone.     Additional medication history information not noted on PTA med list :Pt was recently prescribed cholesterol medication (CareEverywhere shows this was rosuvastatin 5mg HS) but patient does not take it due to cost of medication. She also states he should probably be taking an aspirin as suggested by his provider but he is not taking this. Daughter also states patient used to take OTC meds for seasonal allergies.     Medication reconciliation/reorder completed by provider prior to medication history? N/A    Time spent in this activity: 15 minutes    Prior to Admission medications    Medication Sig Last Dose Taking? Auth Provider   HYDROCHLOROTHIAZIDE PO Take 25 mg by mouth daily Past Week at Unknown time Yes Unknown, Entered By History   METOPROLOL SUCCINATE ER PO Take 25 mg by mouth daily Past Week at Unknown time Yes Unknown, Entered By History

## 2018-07-17 NOTE — PROGRESS NOTES
Brief no charge note.    Patient admitted by my colleague Dr Hough early this am.    Javier Gaston is a 63 year old male with hypertension, dyslipidemia and alcohol abuse who was brought by family for concerns for alcohol withdrawal     O/E, still with gross tremors, slightly anxious    # ETOH abuse with alcohol withdrawal  - Still with tremors; evaluated by psychiatry, declines chemical dependency evaluation  - On Valium with CIWA protocol  - Continue thiamine and multivitamin  - IV hydration with normal saline at 100ml /hr    # HTN  - BP in 160s; resumed prior to admission HCTZ and metoprolol; hydralazine p.r.n. for systolic blood pressure more than 180    # Dyslipidemia  - Continue PT simvastatin    Dispo: likely in 1-2 days when medically stable with no concerns for withdrawal

## 2018-07-17 NOTE — PLAN OF CARE
Problem: Patient Care Overview  Goal: Plan of Care/Patient Progress Review  Outcome: No Change  A&O x4, speacks Romanian and dtr at bedside translating.  scheduled for 0937-4741. VSS on RA; ex /100. SBA. CIWA 0 and 2 for anxiousness. Last ETOH drink 2100 7/16. Pt has Hx of ETOH withdrawals with tremors, anxiety and hallucinations. PIV SL. Cont pulse ox on. Ref PCD. CD consulted.

## 2018-07-17 NOTE — PROGRESS NOTES
RECEIVING UNIT ED HANDOFF REVIEW    ED Nurse Handoff Report was reviewed by: Lis Conklin on July 17, 2018 at 2:57 AM

## 2018-07-17 NOTE — CONSULTS
Pt seen for initial psychiatric evaluation, please see my dictation for details and recommendations. Patient denies psychiatric symptoms. Admits he drinks heavily and experiences delirium but he is not willing to pursue CD treatment or MAT. Family present at time of assessment and agree that it is his choice.

## 2018-07-17 NOTE — ED NOTES
[facility]  ED Nurse Handoff Report    ED Chief complaint: Alcohol Intoxication (per family, pt was called by nurse today and told that he has high blood pressure, but pt has not been taking newly prescribed medications. per family pt has also been heavliy drinking for the past 3 days, which is unusual for him.) and Hypertension      ED Diagnosis:   Final diagnoses:   Alcohol abuse   Alcohol dependence with uncomplicated intoxication (H)   Hypertension, unspecified type       Code Status: Full Code    Allergies:   Allergies   Allergen Reactions     Tree Nuts [Nuts]        Activity level - Baseline/Home:  Independent    Activity Level - Current:   Stand with Assist     Needed?: Yes    Isolation: No  Infection: Not Applicable  Bariatric?: No    Vital Signs:   Vitals:    07/16/18 2230 07/16/18 2300 07/16/18 2315 07/16/18 2345   BP: (!) 164/100 (!) 157/107 (!) 149/94 (!) 174/100   Resp:       Temp:       TempSrc:       SpO2: 96% 97% 95% 95%   Weight:       Height:           Cardiac Rhythm: ,        Pain level: 0-10 Pain Scale: 0    Is this patient confused?: No   [place]   Suicide Severity Rating Scale Completed?  Yes  If yes, what color did the patient score?  White    Patient Report: Initial Complaint: Pt. Brought to ER by family with concerns that pt. Has not been taking blood pressure meds and concurrently drinking heavily. Tonight pt. Ingested 12 pack of beer.   Focused Assessment: Pt. Alert and follows commands, pleasant, unstable on feet. Denies CP or SOB.   Tests Performed: Labs  Abnormal Results:   Results for orders placed or performed during the hospital encounter of 07/16/18 (from the past 24 hour(s))   CBC with platelets differential   Result Value Ref Range    WBC 10.3 4.0 - 11.0 10e9/L    RBC Count 4.70 4.4 - 5.9 10e12/L    Hemoglobin 15.5 13.3 - 17.7 g/dL    Hematocrit 44.7 40.0 - 53.0 %    MCV 95 78 - 100 fl    MCH 33.0 26.5 - 33.0 pg    MCHC 34.7 31.5 - 36.5 g/dL    RDW  13.9 10.0 - 15.0 %    Platelet Count 186 150 - 450 10e9/L    Diff Method Automated Method     % Neutrophils 37.1 %    % Lymphocytes 40.9 %    % Monocytes 17.2 %    % Eosinophils 3.7 %    % Basophils 0.6 %    % Immature Granulocytes 0.5 %    Nucleated RBCs 0 0 /100    Absolute Neutrophil 3.8 1.6 - 8.3 10e9/L    Absolute Lymphocytes 4.2 0.8 - 5.3 10e9/L    Absolute Monocytes 1.8 (H) 0.0 - 1.3 10e9/L    Absolute Eosinophils 0.4 0.0 - 0.7 10e9/L    Absolute Basophils 0.1 0.0 - 0.2 10e9/L    Abs Immature Granulocytes 0.1 0 - 0.4 10e9/L    Absolute Nucleated RBC 0.0    Comprehensive metabolic panel   Result Value Ref Range    Sodium 144 133 - 144 mmol/L    Potassium 3.5 3.4 - 5.3 mmol/L    Chloride 107 94 - 109 mmol/L    Carbon Dioxide 30 20 - 32 mmol/L    Anion Gap 7 3 - 14 mmol/L    Glucose 159 (H) 70 - 99 mg/dL    Urea Nitrogen 6 (L) 7 - 30 mg/dL    Creatinine 0.63 (L) 0.66 - 1.25 mg/dL    GFR Estimate >90 >60 mL/min/1.7m2    GFR Estimate If Black >90 >60 mL/min/1.7m2    Calcium 8.3 (L) 8.5 - 10.1 mg/dL    Bilirubin Total 0.2 0.2 - 1.3 mg/dL    Albumin 3.4 3.4 - 5.0 g/dL    Protein Total 7.1 6.8 - 8.8 g/dL    Alkaline Phosphatase 180 (H) 40 - 150 U/L    ALT 82 (H) 0 - 70 U/L    AST 38 0 - 45 U/L   Troponin I   Result Value Ref Range    Troponin I ES <0.015 0.000 - 0.045 ug/L   Alcohol ethyl   Result Value Ref Range    Ethanol g/dL 0.25 (H) <0.01 g/dL   Magnesium   Result Value Ref Range    Magnesium 2.4 (H) 1.6 - 2.3 mg/dL   EKG 12-lead, tracing only   Result Value Ref Range    Interpretation ECG Click View Image link to view waveform and result      Treatments provided: IV multi Vit. Bag=1000cc infused.     Family Comments: Family present    OBS brochure/video discussed/provided to patient: N/A    ED Medications:   Medications   LORazepam (ATIVAN) injection 1-2 mg (not administered)   sodium chloride 0.9 % 1,000 mL with INFUVITE 10 mL, thiamine 100 mg, folic acid 1 mg infusion ( Intravenous New Bag 7/16/18 3575)        Drips infusing?:  No    For the majority of the shift this patient was Green.   Interventions performed were None.    Severe Sepsis OR Septic Shock Diagnosis Present: No      ED NURSE PHONE NUMBER: [phone]

## 2018-07-17 NOTE — H&P
[facility]    History and Physical  Hospitalist       Date of Admission:  7/16/2018    Assessment & Plan   Javier Gaston is a 63 year old male who presents with alcohol withdrawal    Alcohol withdrawal  - CIWA with valium and vitamins  - chem dep  - seizure and aspiration precautions      HTN  - continue the HCTZ and treat withdrawal    Hepatitis, likley alcoholic  - trend, if increasing broaden workup    HLD  - continue his home simvastatin      DVT Prophylaxis: Pneumatic Compression Devices  Code Status: Full Code    Disposition: Expected discharge in 2-3 days once withdrawal abates.    Michael Hough DO    Primary Care Physician   Viry Cline    Chief Complaint   intoxication    History is obtained from the patient and family with help from     History of Present Illness   Javier Gaston is a 63 year old male who presents with etoh intoxication and hypertension. Basically he saw PCP a few days ago and was sent to the lab, called today with results that his cholestol was high and needed to make an appointment. His family took this opportunity to take him to the ED, expressing their desire that he sober up. Apparently he drinks very heavily, especially lately because of his birthday, and has shaking and hallucinations whenever he decides to stop drinking. They are concerning.    The patient does not think this is a problem he states, but he is agreeable to staying per his family wishes. No desire to self harm.    Past Medical History    I have reviewed this patient's medical history and updated it with pertinent information if needed.   Past Medical History:   Diagnosis Date     Alcohol abuse      Hypercholesteremia      Hypertension        Past Surgical History   I have reviewed this patient's surgical history and updated it with pertinent information if needed.  Past Surgical History:   Procedure Laterality Date     ORTHOPEDIC SURGERY  11 years ago     right knee       Prior to Admission Medications   Prior to Admission Medications   Prescriptions Last Dose Informant Patient Reported? Taking?   ADVIL OR   Yes No   Si CAPSULE EVERY 4 TO 6 HOURS AS NEEDED   NAPROSYN 500 MG OR TABS   No No   Sig: ONE TABLET TWICE DAILY WITH FOOD   SKELAXIN 800 MG OR TABS   No No   Si TABLET 3 TIMES DAILY   UNKNOWN TO PATIENT   Yes No   Sig: Cholesterol Medication    VICODIN 5-500 MG OR TABS   No No   Sig: ONE TO TWO TABLETS EVERY 4 TO 6 HOURS AS NEEDED FOR PAIN      Facility-Administered Medications: None     Allergies   Allergies   Allergen Reactions     Tree Nuts [Nuts]        Social History   I have reviewed this patient's social history and updated it with pertinent information if needed. Jaison Marilin  reports that he has been smoking.  He has never used smokeless tobacco. He reports that he drinks alcohol. He reports that he does not use illicit drugs.    Family History   I have reviewed this patient's family history and updated it with pertinent information if needed.   No family history on file.    Review of Systems   The 10 point Review of Systems is negative other than noted in the HPI or here.     Physical Exam   Temp: 98.1  F (36.7  C) Temp src: Oral BP: (!) 174/100   Heart Rate: 94 Resp: 16 SpO2: 95 % O2 Device: None (Room air)    Vital Signs with Ranges  Temp:  [98.1  F (36.7  C)] 98.1  F (36.7  C)  Heart Rate:  [94] 94  Resp:  [16] 16  BP: (149-174)/() 174/100  SpO2:  [95 %-97 %] 95 %  175 lbs 0 oz    Constitutional: Awake, alert, cooperative, no apparent distress.  Eyes: Conjunctiva and pupils examined and normal.  HEENT: Moist mucous membranes, normal dentition.  Respiratory: Clear to auscultation bilaterally, no crackles or wheezing.  Cardiovascular: Regular rate and rhythm, normal S1 and S2, and no murmur noted.  GI: Soft, non-distended, non-tender, normal bowel sounds.  Lymph/Hematologic: No anterior cervical or supraclavicular  adenopathy.  Skin: No rashes, no cyanosis, no edema.  Musculoskeletal: No joint swelling, erythema or tenderness.  Neurologic: Cranial nerves 2-12 intact, normal strength and sensation. Tremor of the bilateral legs and arms present. No asterixis. Unsteady upon standing  Psychiatric: Alert, oriented to person, place and time, no obvious anxiety or depression.    Data   Data reviewed today:  I personally reviewed the EKG tracing showing NSR with nonspecific ST signs.    Recent Labs  Lab 07/16/18  2145   WBC 10.3   HGB 15.5   MCV 95         POTASSIUM 3.5   CHLORIDE 107   CO2 30   BUN 6*   CR 0.63*   ANIONGAP 7   ULISES 8.3*   *   ALBUMIN 3.4   PROTTOTAL 7.1   BILITOTAL 0.2   ALKPHOS 180*   ALT 82*   AST 38   TROPI <0.015       Imaging:  No results found for this or any previous visit (from the past 24 hour(s)).

## 2018-07-18 ENCOUNTER — APPOINTMENT (OUTPATIENT)
Dept: ULTRASOUND IMAGING | Facility: CLINIC | Age: 63
DRG: 897 | End: 2018-07-18
Attending: INTERNAL MEDICINE
Payer: COMMERCIAL

## 2018-07-18 LAB
ALBUMIN SERPL-MCNC: 3.4 G/DL (ref 3.4–5)
ALP SERPL-CCNC: 170 U/L (ref 40–150)
ALT SERPL W P-5'-P-CCNC: 63 U/L (ref 0–70)
ANION GAP SERPL CALCULATED.3IONS-SCNC: 9 MMOL/L (ref 3–14)
AST SERPL W P-5'-P-CCNC: 32 U/L (ref 0–45)
BILIRUB SERPL-MCNC: 1 MG/DL (ref 0.2–1.3)
BUN SERPL-MCNC: 7 MG/DL (ref 7–30)
CALCIUM SERPL-MCNC: 8.4 MG/DL (ref 8.5–10.1)
CHLORIDE SERPL-SCNC: 102 MMOL/L (ref 94–109)
CO2 SERPL-SCNC: 27 MMOL/L (ref 20–32)
CREAT SERPL-MCNC: 0.58 MG/DL (ref 0.66–1.25)
GFR SERPL CREATININE-BSD FRML MDRD: >90 ML/MIN/1.7M2
GLUCOSE BLDC GLUCOMTR-MCNC: 138 MG/DL (ref 70–99)
GLUCOSE SERPL-MCNC: 145 MG/DL (ref 70–99)
HBA1C MFR BLD: 7.1 % (ref 0–5.6)
POTASSIUM SERPL-SCNC: 3.3 MMOL/L (ref 3.4–5.3)
POTASSIUM SERPL-SCNC: 3.8 MMOL/L (ref 3.4–5.3)
PROT SERPL-MCNC: 7.2 G/DL (ref 6.8–8.8)
SODIUM SERPL-SCNC: 138 MMOL/L (ref 133–144)

## 2018-07-18 PROCEDURE — 80053 COMPREHEN METABOLIC PANEL: CPT | Performed by: HOSPITALIST

## 2018-07-18 PROCEDURE — 76705 ECHO EXAM OF ABDOMEN: CPT

## 2018-07-18 PROCEDURE — 83036 HEMOGLOBIN GLYCOSYLATED A1C: CPT | Performed by: HOSPITALIST

## 2018-07-18 PROCEDURE — 84132 ASSAY OF SERUM POTASSIUM: CPT | Performed by: INTERNAL MEDICINE

## 2018-07-18 PROCEDURE — 25000132 ZZH RX MED GY IP 250 OP 250 PS 637: Performed by: HOSPITALIST

## 2018-07-18 PROCEDURE — 00000146 ZZHCL STATISTIC GLUCOSE BY METER IP

## 2018-07-18 PROCEDURE — 25000128 H RX IP 250 OP 636: Performed by: HOSPITALIST

## 2018-07-18 PROCEDURE — 25000132 ZZH RX MED GY IP 250 OP 250 PS 637: Performed by: INTERNAL MEDICINE

## 2018-07-18 PROCEDURE — 99232 SBSQ HOSP IP/OBS MODERATE 35: CPT | Performed by: INTERNAL MEDICINE

## 2018-07-18 PROCEDURE — 36415 COLL VENOUS BLD VENIPUNCTURE: CPT | Performed by: INTERNAL MEDICINE

## 2018-07-18 PROCEDURE — 36415 COLL VENOUS BLD VENIPUNCTURE: CPT | Performed by: HOSPITALIST

## 2018-07-18 PROCEDURE — 12000000 ZZH R&B MED SURG/OB

## 2018-07-18 RX ORDER — METOPROLOL SUCCINATE 50 MG/1
50 TABLET, EXTENDED RELEASE ORAL DAILY
Status: DISCONTINUED | OUTPATIENT
Start: 2018-07-18 | End: 2018-07-19 | Stop reason: HOSPADM

## 2018-07-18 RX ADMIN — FOLIC ACID 1 MG: 1 TABLET ORAL at 10:22

## 2018-07-18 RX ADMIN — POTASSIUM CHLORIDE 20 MEQ: 1500 TABLET, EXTENDED RELEASE ORAL at 13:03

## 2018-07-18 RX ADMIN — POTASSIUM CHLORIDE 40 MEQ: 1500 TABLET, EXTENDED RELEASE ORAL at 10:21

## 2018-07-18 RX ADMIN — ROSUVASTATIN CALCIUM 5 MG: 5 TABLET, FILM COATED ORAL at 10:21

## 2018-07-18 RX ADMIN — MULTIPLE VITAMINS W/ MINERALS TAB 1 TABLET: TAB at 10:22

## 2018-07-18 RX ADMIN — METOPROLOL SUCCINATE 50 MG: 50 TABLET, EXTENDED RELEASE ORAL at 10:24

## 2018-07-18 RX ADMIN — HYDROCHLOROTHIAZIDE 25 MG: 25 TABLET ORAL at 10:21

## 2018-07-18 RX ADMIN — Medication 100 MG: at 10:22

## 2018-07-18 RX ADMIN — SENNOSIDES AND DOCUSATE SODIUM 1 TABLET: 8.6; 5 TABLET ORAL at 10:23

## 2018-07-18 RX ADMIN — SODIUM CHLORIDE: 9 INJECTION, SOLUTION INTRAVENOUS at 03:10

## 2018-07-18 ASSESSMENT — ACTIVITIES OF DAILY LIVING (ADL)
ADLS_ACUITY_SCORE: 11

## 2018-07-18 NOTE — PROGRESS NOTES
"[facility]    Hospitalist Progress Note    Assessment & Plan   Javier Gaston is a 63 year old male who was admitted on 7/16/2018 for alcohol withdrawal    Summary:     Alcohol withdrawal    CIWA with valium and vitamins    chem dep/psy consult     \" Although the patient would benefit from chemical use assessment and possibly chemical health treatment, he elects to continue the      use of alcohol and is not interested in pursuing chemical health treatment or medication-assisted treatment.  The deleterious effects of     alcohol were discussed with him and he verbalized understanding.  He claims he has been able to stop alcohol use on his own in the past and feels he is capable of doing the same when he is ready.  His wife and daughter who were present would prefer for him to accept medication-assisted treatment, but they were also of the opinion that it was the patient's choice and so no further recommendations are given at this time.  He is not holdable or committable at this time.     seizure and aspiration precautions     HTN    continue the HCTZ and treat withdrawal was likely driving the BP    BP remains elevated and little evidence of w/d.     Increase dose of metoprolol and will need outpatient follow up of his BP    Liver E elevation, ? Alcoholic    Pattern suggest non alcoholic. Steatosis?    US liver    Trend shows decrease in E. Can continue statin but needs outpatient follow up    Elevated glucose    Stress related vs DM type 2    Check HA1C     HLD  - continue his home simvastatin     # Pain Assessment:  Current Pain Score 7/18/2018   Patient currently in pain? sleeping: patient not able to self report   Pain score (0-10) -   Samia pain level was assessed and he currently denies pain.        DVT Prophylaxis: Low Risk/Ambulatory with no VTE prophylaxis indicated  Code Status: Full Code    Disposition: Expected discharge in am tomorrow     Adebayo Elizondo, " MD  [phone] (P)  Text Page  (7am to 6pm)    Interval History   uneventful night, not delirious, no seizure. Alert and oriented 3x    -Data reviewed today: I reviewed all new labs and imaging results over the last 24 hours. I personally reviewed no images or EKG's today.    Physical Exam   Temp: 98.2  F (36.8  C) Temp src: Oral BP: (!) 157/101   Heart Rate: 83 Resp: 16 SpO2: 95 % O2 Device: None (Room air)    Vitals:    07/16/18 2109   Weight: 79.4 kg (175 lb)     Vital Signs with Ranges  Temp:  [98.2  F (36.8  C)-98.8  F (37.1  C)] 98.2  F (36.8  C)  Heart Rate:  [70-92] 83  Resp:  [16] 16  BP: (154-177)/() 157/101  SpO2:  [95 %-97 %] 95 %  I/O last 3 completed shifts:  In: 480 [P.O.:480]  Out: 1 [Urine:1]      Constitutional:  No acute distress   Neuro:   Alert:: Yes   Grossly non focal: Yes   HENT:   Pupils equal and reactive to light : Yes   Neck No obvious mass or swelling  CV:   Neck vein: non distended  RRR, S1S2 no gallop,  No rub, no murmur  LE Edema: NO  Pulm:  No respiratory distress  Bilateral chest symmetrical chest expansion on inspection  Auscultation: clear  Abdomen:   Soft, non tender, Bowel sound present   Uro-genital:   Yousif present: No  Extremities:   Joints: No gross deformities or swelling  Muscle mass: normal  Skin:   Warm, normal color and capillary refill.      Medications     sodium chloride 100 mL/hr at 07/18/18 0310       folic acid  1 mg Oral Daily     hydrochlorothiazide (HYDRODIURIL) tablet 25 mg  25 mg Oral Daily     metoprolol succinate (TOPROL-XL) 24 hr tablet 25 mg  25 mg Oral Daily     multivitamin, therapeutic with minerals  1 tablet Oral Daily     rosuvastatin  5 mg Oral Daily     senna-docusate  1 tablet Oral BID    Or     senna-docusate  2 tablet Oral BID     IV Fluid with vitamins   Intravenous Once     thiamine  100 mg Oral Daily       Data     Recent Labs  Lab 07/18/18  0921 07/16/18  2145   WBC  --  10.3   HGB  --  15.5   MCV  --  95   PLT  --  186    144    POTASSIUM 3.3* 3.5   CHLORIDE 102 107   CO2 27 30   BUN 7 6*   CR 0.58* 0.63*   ANIONGAP 9 7   ULISES 8.4* 8.3*   * 159*   ALBUMIN 3.4 3.4   PROTTOTAL 7.2 7.1   BILITOTAL 1.0 0.2   ALKPHOS 170* 180*   ALT 63 82*   AST 32 38   TROPI  --  <0.015       Imaging:  No results found for this or any previous visit (from the past 24 hour(s)).

## 2018-07-18 NOTE — PLAN OF CARE
Problem: Patient Care Overview  Goal: Plan of Care/Patient Progress Review  Outcome: Improving  Pt a/o x4. Seizure precautions maintained. CIWA 0/0. Tolerating regular diet. Wife at bedside all of shift. VSS on RA, denies pain. IVF  ml/h continuous. SBA to BR. CD following. D/C home today pending progress, nursing will continue to monitor.

## 2018-07-18 NOTE — PLAN OF CARE
Problem: Patient Care Overview  Goal: Plan of Care/Patient Progress Review  Outcome: Improving  Pt receiving fluids IV, semi-fowlers with seizure precaution pads in place. VSS except for constantly high BP (not taking home meds), scheduled BP meds in AM. CIWA q4hr scoring 2 and 0. Plan for d/c 7/18. No meds given on evening shift. Speaks and understands some English.

## 2018-07-18 NOTE — PLAN OF CARE
Problem: Patient Care Overview  Goal: Plan of Care/Patient Progress Review  Outcome: Improving  Patient A&Ox4, Polish speaking, but speaks english.  VSS, except BP elevated this am 157/101, Metoprolol dose increased.  CIWAA 0 and 0.  Denies pain.  Scheduled for US abdomen at 5pm this evening.  NPO for US.  No S/S of withdrawal.  K low 3.3, replaced, rechek ordered for this evening.  Liver enzymes improved today.  .  Continue to monitor.

## 2018-07-19 VITALS
HEIGHT: 66 IN | OXYGEN SATURATION: 96 % | RESPIRATION RATE: 16 BRPM | SYSTOLIC BLOOD PRESSURE: 173 MMHG | WEIGHT: 175 LBS | BODY MASS INDEX: 28.12 KG/M2 | TEMPERATURE: 97.8 F | DIASTOLIC BLOOD PRESSURE: 106 MMHG

## 2018-07-19 LAB
GLUCOSE BLDC GLUCOMTR-MCNC: 104 MG/DL (ref 70–99)
GLUCOSE BLDC GLUCOMTR-MCNC: 140 MG/DL (ref 70–99)
GLUCOSE BLDC GLUCOMTR-MCNC: 180 MG/DL (ref 70–99)

## 2018-07-19 PROCEDURE — 25000132 ZZH RX MED GY IP 250 OP 250 PS 637: Performed by: HOSPITALIST

## 2018-07-19 PROCEDURE — 25000132 ZZH RX MED GY IP 250 OP 250 PS 637: Performed by: INTERNAL MEDICINE

## 2018-07-19 PROCEDURE — 99239 HOSP IP/OBS DSCHRG MGMT >30: CPT | Performed by: INTERNAL MEDICINE

## 2018-07-19 PROCEDURE — 00000146 ZZHCL STATISTIC GLUCOSE BY METER IP

## 2018-07-19 RX ORDER — LANOLIN ALCOHOL/MO/W.PET/CERES
100 CREAM (GRAM) TOPICAL DAILY
Qty: 3 TABLET | Refills: 0 | Status: SHIPPED | OUTPATIENT
Start: 2018-07-19 | End: 2018-07-22

## 2018-07-19 RX ORDER — LISINOPRIL 10 MG/1
10 TABLET ORAL DAILY
Qty: 30 TABLET | Refills: 3 | Status: ON HOLD | OUTPATIENT
Start: 2018-07-19 | End: 2024-05-11

## 2018-07-19 RX ORDER — LISINOPRIL 10 MG/1
10 TABLET ORAL DAILY
Status: DISCONTINUED | OUTPATIENT
Start: 2018-07-19 | End: 2018-07-19 | Stop reason: HOSPADM

## 2018-07-19 RX ORDER — MULTIPLE VITAMINS W/ MINERALS TAB 9MG-400MCG
1 TAB ORAL DAILY
Qty: 30 EACH | Refills: 0 | Status: ON HOLD | OUTPATIENT
Start: 2018-07-19 | End: 2024-05-11

## 2018-07-19 RX ORDER — FOLIC ACID 1 MG/1
1 TABLET ORAL DAILY
Qty: 30 TABLET | Refills: 0 | Status: ON HOLD | OUTPATIENT
Start: 2018-07-19 | End: 2024-05-11

## 2018-07-19 RX ORDER — METOPROLOL SUCCINATE 50 MG/1
50 TABLET, EXTENDED RELEASE ORAL DAILY
Qty: 30 TABLET | Refills: 3 | Status: SHIPPED | OUTPATIENT
Start: 2018-07-19

## 2018-07-19 RX ORDER — ROSUVASTATIN CALCIUM 5 MG/1
5 TABLET, COATED ORAL DAILY
Qty: 30 TABLET | Refills: 3 | Status: ON HOLD | OUTPATIENT
Start: 2018-07-19 | End: 2024-05-11

## 2018-07-19 RX ADMIN — Medication 100 MG: at 09:13

## 2018-07-19 RX ADMIN — HYDROCHLOROTHIAZIDE 25 MG: 25 TABLET ORAL at 09:13

## 2018-07-19 RX ADMIN — FOLIC ACID 1 MG: 1 TABLET ORAL at 09:13

## 2018-07-19 RX ADMIN — ROSUVASTATIN CALCIUM 5 MG: 5 TABLET, FILM COATED ORAL at 09:13

## 2018-07-19 RX ADMIN — LISINOPRIL 10 MG: 10 TABLET ORAL at 09:13

## 2018-07-19 RX ADMIN — METOPROLOL SUCCINATE 50 MG: 50 TABLET, EXTENDED RELEASE ORAL at 09:13

## 2018-07-19 RX ADMIN — MULTIPLE VITAMINS W/ MINERALS TAB 1 TABLET: TAB at 09:13

## 2018-07-19 ASSESSMENT — ACTIVITIES OF DAILY LIVING (ADL)
ADLS_ACUITY_SCORE: 11

## 2018-07-19 NOTE — PLAN OF CARE
Problem: Patient Care Overview  Goal: Plan of Care/Patient Progress Review  Outcome: Adequate for Discharge Date Met: 07/19/18  Patient A&Ox4.  Up independently in room.  BP continues to be high, on scheduled BP medications, other vitals stable.  Denies pain.  Denies headache.  Pt discharged to home today with grand daughter.  Reviewed discharge instructions with pt/grand daughter and  present.  Grand daughter noted at time of that pt had new rash on face/chest area.  Pt/grand daughter anxious to discharge as they have an important scheduled doctor's appt scheduled for 10 am and were not willing to wait for doctor to reassess.  Notified hospitalist and stated will need to follow up with primary doctor since pt already discharge.

## 2018-07-19 NOTE — DISCHARGE SUMMARY
"[facility]    Discharge Summary  Hospitalist    Date of Admission:  7/16/2018  Date of Discharge:  7/19/2018  Discharging Provider: Adebayo Elizondo MD    Discharge Diagnoses   Alcohol withdraw  Alcohol hepatitis and steatosis  Hypertension  Diabetes type 2 (new diagnosis)    History of Present Illness   Javier Gaston is a 63 year old male who presents with etoh intoxication and hypertension. Basically he saw PCP a few days ago and was sent to the lab, called today with results that his cholestol was high and needed to make an appointment. His family took this opportunity to take him to the ED, expressing their desire that he sober up. Apparently he drinks very heavily, especially lately because of his birthday, and has shaking and hallucinations whenever he decides to stop drinking. They are concerning.     The patient does not think this is a problem he states, but he is agreeable to staying per his family wishes. No desire to self harm    Hospital Course   Javier Gaston was admitted on 7/16/2018.  The following problems were addressed during his hospitalization:    Alcohol withdrawal    CIWA with valium and vitamins    chem dep/psy consult     \" Although the patient would benefit from chemical use assessment and possibly chemical health treatment, he elects to continue the      use of alcohol and is not interested in pursuing chemical health treatment or medication-assisted treatment.  The deleterious effects of     alcohol were discussed with him and he verbalized understanding.  He claims he has been able to stop alcohol use on his own in the           past and feels he is capable of doing the same when he is ready.  His wife and daughter who were present would prefer for him to       accept medication-assisted treatment, but they were also of the opinion that it was the patient's choice and so no further       recommendations are given at this time.  He is " "not holdable or committable at this time\"       HTN    BP remains elevated and lno evidence of alcohol w/d on day of discharge    On hydrochlorothiazide and metoprolol. Added lisinopril on day of discharge    Will need follow up with his PCP     Liver E elevation,    Pattern suggests Steatosis    US liver confirmed the latter    Need follow up of his liver enzyme as he is also on statin     Elevated glucose    HbA1C consistent with DM type 2    Level is not high enough to warrant pharmacological treatment without a try to life style modification    Neeeds education and follow up with his primary as this is a new diagnosis      HLD    continue his home simvastatin    Monitor liver enzyme      Adebayo Elizondo MD    Significant Results and Procedures   See below    Pending Results   These results will be followed up by NA  Unresulted Labs Ordered in the Past 30 Days of this Admission     No orders found from 5/17/2018 to 7/17/2018.          Code Status   Full Code       Primary Care Physician   Viry Cline    Physical Exam   Temp: 97.7  F (36.5  C) Temp src: Oral BP: 147/89   Heart Rate: 79 Resp: 16 SpO2: 94 % O2 Device: None (Room air)    Vitals:    07/16/18 2109   Weight: 79.4 kg (175 lb)     Vital Signs with Ranges  Temp:  [97.7  F (36.5  C)-98.2  F (36.8  C)] 97.7  F (36.5  C)  Heart Rate:  [79-83] 79  Resp:  [16] 16  BP: (147-157)/() 147/89  SpO2:  [94 %-96 %] 94 %  I/O last 3 completed shifts:  In: 1825 [P.O.:480; I.V.:1345]  Out: -     Constitutional:  No acute distress   Neuro:   Alert:: Yes   Grossly non focal: Yes   HENT:   Pupils equal and reactive to light : Yes   Neck No obvious mass or swelling  CV:   Neck vein: non distended  RRR, S1S2 no gallop,  No rub, no murmur  LE Edema: NO  Pulm:  No respiratory distress  Bilateral chest symmetrical chest expansion on inspection  Auscultation: clear  Abdomen:   Soft, non tender, Bowel sound present   Uro-genital:   Yousif present: No  Extremities: "   Joints: No gross deformities or swelling  Muscle mass: normal  Skin:   Warm, normal color and capillary refill.    Discharge Disposition   Discharged to home  Condition at discharge: Good    Consultations This Hospital Stay   CHEMICAL DEPENDENCY IP CONSULT  PSYCHIATRY IP CONSULT    Time Spent on this Encounter   I, Adebayo Elizondo, personally saw the patient today and spent greater than 30 minutes discharging this patient.    Discharge Orders   No discharge procedures on file.  Discharge Medications   Current Discharge Medication List      START taking these medications    Details   folic acid (FOLVITE) 1 MG tablet Take 1 tablet (1 mg) by mouth daily  Qty: 30 tablet, Refills: 0    Associated Diagnoses: Alcohol abuse      lisinopril (PRINIVIL/ZESTRIL) 10 MG tablet Take 1 tablet (10 mg) by mouth daily  Qty: 30 tablet, Refills: 3    Associated Diagnoses: Benign essential hypertension      multivitamin, therapeutic with minerals (THERA-VIT-M) TABS tablet Take 1 tablet by mouth daily  Qty: 30 each, Refills: 0    Associated Diagnoses: Alcohol abuse      rosuvastatin (CRESTOR) 5 MG tablet Take 1 tablet (5 mg) by mouth daily  Qty: 30 tablet, Refills: 3    Associated Diagnoses: Hyperlipidemia, unspecified hyperlipidemia type      thiamine 100 MG tablet Take 1 tablet (100 mg) by mouth daily for 3 days  Qty: 3 tablet, Refills: 0    Associated Diagnoses: Alcohol abuse         CONTINUE these medications which have CHANGED    Details   metoprolol succinate (TOPROL-XL) 50 MG 24 hr tablet Take 1 tablet (50 mg) by mouth daily  Qty: 30 tablet, Refills: 3    Associated Diagnoses: Alcohol abuse         CONTINUE these medications which have NOT CHANGED    Details   HYDROCHLOROTHIAZIDE PO Take 25 mg by mouth daily           Allergies   Allergies   Allergen Reactions     Tree Nuts [Nuts]      Data   Most Recent 3 CBC's:  Recent Labs   Lab Test  07/16/18   2145  12/31/10   2220   WBC  10.3  10.3   HGB  15.5  14.9   MCV  95  97    PLT  186  169      Most Recent 3 BMP's:  Recent Labs   Lab Test  07/18/18   1645  07/18/18   0921  07/16/18   2145  12/31/10   2220   NA   --   138  144  147*   POTASSIUM  3.8  3.3*  3.5  4.1   CHLORIDE   --   102  107  108   CO2   --   27  30  24   BUN   --   7  6*  9   CR   --   0.58*  0.63*  0.77   ANIONGAP   --   9  7  15   ULISES   --   8.4*  8.3*  8.4*   GLC   --   145*  159*  89     Most Recent 2 LFT's:  Recent Labs   Lab Test  07/18/18   0921  07/16/18   2145   AST  32  38   ALT  63  82*   ALKPHOS  170*  180*   BILITOTAL  1.0  0.2     Most Recent INR's and Anticoagulation Dosing History:  Anticoagulation Dose History     There is no flowsheet data to display.        Most Recent 3 Troponin's:  Recent Labs   Lab Test  07/16/18 2145   TROPI  <0.015     Most Recent Cholesterol Panel:No lab results found.  Most Recent 6 Bacteria Isolates From Any Culture (See EPIC Reports for Culture Details):No lab results found.  Most Recent TSH, T4 and A1c Labs:  Recent Labs   Lab Test  07/18/18   0921   A1C  7.1*     Results for orders placed or performed during the hospital encounter of 07/16/18   US Abdomen Limited    Narrative    ULTRASOUND ABDOMEN LIMITED RIGHT UPPER QUADRANT   7/18/2018 5:34 PM     HISTORY: Elevated liver function tests.    COMPARISON: None.    FINDINGS: Mild to moderate diffuse increased hepatic echogenicity,  consistent with fatty infiltration. No hepatic masses. The gallbladder  is unremarkable without gallstones, wall thickening or pericholecystic  fluid. No focal tenderness over the gallbladder. No intra or  extrahepatic biliary dilatation. The common duct measures 0.4 cm in  diameter. The right kidney has normal size and echogenicity, measuring  10.1 cm in length. No right intrarenal collecting system dilatation,  calculi or masses. No free fluid in the upper right hemiabdomen.      Impression    IMPRESSION:   1. Mild to moderate diffuse fatty infiltration of the liver.  2. Unremarkable appearance  of the gallbladder.  3. No biliary dilatation.    CHARITY HINKLE MD

## 2018-07-19 NOTE — PROGRESS NOTES
.Discharge    Patient discharged to home via w/c with grand daughter  Care plan note-done    Listed belongings gathered and returned to patient. Yes  Care Plan and Patient education resolved: Yes  Prescriptions if needed, hard copies sent with patient  Yes  Home and hospital acquired medications returned to patient: Yes  Medication Bin checked and emptied on discharge Yes  Follow up appointment made for patient: Yes

## 2018-07-19 NOTE — PLAN OF CARE
Problem: Patient Care Overview  Goal: Plan of Care/Patient Progress Review  Outcome: Improving  Pt a/o x4. Up ad marilee independently to BR. CIWA 0/0. Adequate PO intake. VSS on RA. Denies pain/n/v/SOB. IV SL patent. Discharge today pending progress, nursing will continue to monitor.

## 2018-07-19 NOTE — DISCHARGE INSTRUCTIONS
Patient belongings kept with patient    Appt scheduled with Dr. Cline on Thursday July 26th at 10:20 am.  At [facility]

## 2018-07-19 NOTE — PLAN OF CARE
Problem: Patient Care Overview  Goal: Plan of Care/Patient Progress Review  Outcome: Improving  AOx4. Speaks Latvian, understands some English. VSS, except high BP. CIWA 0 & 0. K+ increased to 3.8. Had US abdomen this shift @ 5:30p. Denies pain. Tolerating diet. No s/s of withdrawal. Independent, voiding in bathroom.

## 2019-10-09 ENCOUNTER — HOSPITAL ENCOUNTER (EMERGENCY)
Facility: CLINIC | Age: 64
Discharge: HOME | End: 2019-10-09
Attending: EMERGENCY MEDICINE | Admitting: EMERGENCY MEDICINE
Payer: COMMERCIAL

## 2019-10-09 ENCOUNTER — APPOINTMENT (OUTPATIENT)
Dept: CT IMAGING | Facility: CLINIC | Age: 64
End: 2019-10-09
Attending: EMERGENCY MEDICINE
Payer: COMMERCIAL

## 2019-10-09 VITALS
HEIGHT: 66 IN | BODY MASS INDEX: 28.12 KG/M2 | OXYGEN SATURATION: 98 % | SYSTOLIC BLOOD PRESSURE: 134 MMHG | RESPIRATION RATE: 16 BRPM | DIASTOLIC BLOOD PRESSURE: 81 MMHG | WEIGHT: 175 LBS | HEART RATE: 63 BPM | TEMPERATURE: 98.2 F

## 2019-10-09 DIAGNOSIS — N20.1 URETEROLITHIASIS: ICD-10-CM

## 2019-10-09 LAB
ALBUMIN SERPL-MCNC: 4.2 G/DL (ref 3.4–5)
ALBUMIN UR-MCNC: NEGATIVE MG/DL
ALP SERPL-CCNC: 193 U/L (ref 40–150)
ALT SERPL W P-5'-P-CCNC: 57 U/L (ref 0–70)
ANION GAP SERPL CALCULATED.3IONS-SCNC: 4 MMOL/L (ref 3–14)
APPEARANCE UR: CLEAR
AST SERPL W P-5'-P-CCNC: 23 U/L (ref 0–45)
BASOPHILS # BLD AUTO: 0.1 10E9/L (ref 0–0.2)
BASOPHILS NFR BLD AUTO: 0.5 %
BILIRUB SERPL-MCNC: 0.4 MG/DL (ref 0.2–1.3)
BILIRUB UR QL STRIP: NEGATIVE
BUN SERPL-MCNC: 10 MG/DL (ref 7–30)
CALCIUM SERPL-MCNC: 8.6 MG/DL (ref 8.5–10.1)
CHLORIDE SERPL-SCNC: 102 MMOL/L (ref 94–109)
CO2 SERPL-SCNC: 31 MMOL/L (ref 20–32)
COLOR UR AUTO: YELLOW
CREAT SERPL-MCNC: 0.65 MG/DL (ref 0.66–1.25)
DIFFERENTIAL METHOD BLD: ABNORMAL
EOSINOPHIL # BLD AUTO: 0.5 10E9/L (ref 0–0.7)
EOSINOPHIL NFR BLD AUTO: 3.1 %
ERYTHROCYTE [DISTWIDTH] IN BLOOD BY AUTOMATED COUNT: 13.8 % (ref 10–15)
GFR SERPL CREATININE-BSD FRML MDRD: >90 ML/MIN/{1.73_M2}
GLUCOSE SERPL-MCNC: 101 MG/DL (ref 70–99)
GLUCOSE UR STRIP-MCNC: NEGATIVE MG/DL
HCT VFR BLD AUTO: 46 % (ref 40–53)
HGB BLD-MCNC: 16.2 G/DL (ref 13.3–17.7)
HGB UR QL STRIP: ABNORMAL
IMM GRANULOCYTES # BLD: 0.1 10E9/L (ref 0–0.4)
IMM GRANULOCYTES NFR BLD: 0.4 %
KETONES UR STRIP-MCNC: NEGATIVE MG/DL
LEUKOCYTE ESTERASE UR QL STRIP: NEGATIVE
LIPASE SERPL-CCNC: 66 U/L (ref 73–393)
LYMPHOCYTES # BLD AUTO: 3.9 10E9/L (ref 0.8–5.3)
LYMPHOCYTES NFR BLD AUTO: 26.7 %
MCH RBC QN AUTO: 32.2 PG (ref 26.5–33)
MCHC RBC AUTO-ENTMCNC: 35.2 G/DL (ref 31.5–36.5)
MCV RBC AUTO: 92 FL (ref 78–100)
MONOCYTES # BLD AUTO: 1.7 10E9/L (ref 0–1.3)
MONOCYTES NFR BLD AUTO: 11.8 %
MUCOUS THREADS #/AREA URNS LPF: PRESENT /LPF
NEUTROPHILS # BLD AUTO: 8.5 10E9/L (ref 1.6–8.3)
NEUTROPHILS NFR BLD AUTO: 57.5 %
NITRATE UR QL: NEGATIVE
NRBC # BLD AUTO: 0 10*3/UL
NRBC BLD AUTO-RTO: 0 /100
PH UR STRIP: 7 PH (ref 5–7)
PLATELET # BLD AUTO: 188 10E9/L (ref 150–450)
POTASSIUM SERPL-SCNC: 3.1 MMOL/L (ref 3.4–5.3)
PROT SERPL-MCNC: 7.9 G/DL (ref 6.8–8.8)
RBC # BLD AUTO: 5.03 10E12/L (ref 4.4–5.9)
RBC #/AREA URNS AUTO: 12 /HPF (ref 0–2)
SODIUM SERPL-SCNC: 137 MMOL/L (ref 133–144)
SOURCE: ABNORMAL
SP GR UR STRIP: 1.01 (ref 1–1.03)
SQUAMOUS #/AREA URNS AUTO: <1 /HPF (ref 0–1)
UROBILINOGEN UR STRIP-MCNC: NORMAL MG/DL (ref 0–2)
WBC # BLD AUTO: 14.7 10E9/L (ref 4–11)
WBC #/AREA URNS AUTO: 2 /HPF (ref 0–5)

## 2019-10-09 PROCEDURE — 96374 THER/PROPH/DIAG INJ IV PUSH: CPT

## 2019-10-09 PROCEDURE — 99285 EMERGENCY DEPT VISIT HI MDM: CPT | Mod: 25

## 2019-10-09 PROCEDURE — 80053 COMPREHEN METABOLIC PANEL: CPT | Performed by: EMERGENCY MEDICINE

## 2019-10-09 PROCEDURE — 83690 ASSAY OF LIPASE: CPT | Performed by: EMERGENCY MEDICINE

## 2019-10-09 PROCEDURE — 81001 URINALYSIS AUTO W/SCOPE: CPT | Performed by: EMERGENCY MEDICINE

## 2019-10-09 PROCEDURE — 74176 CT ABD & PELVIS W/O CONTRAST: CPT

## 2019-10-09 PROCEDURE — 85025 COMPLETE CBC W/AUTO DIFF WBC: CPT | Performed by: EMERGENCY MEDICINE

## 2019-10-09 PROCEDURE — 25000128 H RX IP 250 OP 636: Performed by: EMERGENCY MEDICINE

## 2019-10-09 RX ORDER — ONDANSETRON 4 MG/1
4 TABLET, ORALLY DISINTEGRATING ORAL EVERY 6 HOURS PRN
Qty: 10 TABLET | Refills: 0 | Status: SHIPPED | OUTPATIENT
Start: 2019-10-09 | End: 2019-10-12

## 2019-10-09 RX ORDER — TAMSULOSIN HYDROCHLORIDE 0.4 MG/1
0.4 CAPSULE ORAL DAILY
Qty: 10 CAPSULE | Refills: 0 | Status: ON HOLD | OUTPATIENT
Start: 2019-10-09 | End: 2024-05-11

## 2019-10-09 RX ORDER — KETOROLAC TROMETHAMINE 15 MG/ML
15 INJECTION, SOLUTION INTRAMUSCULAR; INTRAVENOUS ONCE
Status: COMPLETED | OUTPATIENT
Start: 2019-10-09 | End: 2019-10-09

## 2019-10-09 RX ADMIN — KETOROLAC TROMETHAMINE 15 MG: 15 INJECTION, SOLUTION INTRAMUSCULAR; INTRAVENOUS at 21:42

## 2019-10-09 ASSESSMENT — ENCOUNTER SYMPTOMS
VOMITING: 0
SHORTNESS OF BREATH: 0
DYSURIA: 1
ABDOMINAL PAIN: 1
BLOOD IN STOOL: 0
FEVER: 0
DIARRHEA: 1
HEADACHES: 1

## 2019-10-09 ASSESSMENT — MIFFLIN-ST. JEOR: SCORE: 1516.54

## 2019-10-09 NOTE — ED AVS SNAPSHOT
Emergency Department  [address] [place] [id number]  Phone:  [phone]  Fax:  [phone]                                    Javier Cohen   MRN: [id number]    Department:   Emergency Department   Date of Visit:  10/9/2019           After Visit Summary Signature Page    I have received my discharge instructions, and my questions have been answered. I have discussed any challenges I see with this plan with the nurse or doctor.    ..........................................................................................................................................  Patient/Patient Representative Signature      ..........................................................................................................................................  Patient Representative Print Name and Relationship to Patient    ..................................................               ................................................  Date                                   Time    ..........................................................................................................................................  Reviewed by Signature/Title    ...................................................              ..............................................  Date                                               Time          22EPIC Rev 08/18

## 2019-10-10 NOTE — DISCHARGE INSTRUCTIONS
Return to the emergency department or seek medical care as instructed if your symptoms fail to improve or significantly worsen.    Take Tylenol and/or ibuprofen as needed for pain relief; use as directed.    Take Flomax daily as prescribed (discontinue if medication makes you feel lightheaded or dizzy)    Take Zofran as needed for nausea; use as directed.    Follow-up as indicated on page 1.  Maintain adequate hydration and get plenty of rest.

## 2019-10-10 NOTE — ED PROVIDER NOTES
"  History     Chief Complaint:  Abdominal Pain    History limited due to language barrier. History translated by daughter.    KEILA Gaston is a 64 year old male with a history of hypertension, hypercholesteremia, diabetes, diverticula of colon, and alcohol abuse among others who presents to the emergency department today for evaluation of abdominal pain. The patient reports the development of left sided abdominal pain this morning followed shortly by an episode of runny stool and a burning following small amounts of urination. He explains that this prompted presentation to clinic, where he developed right sided abdominal pain following the provider's examination. Currently the patient reports bilateral abdominal pain, with right greater than left, that is lesser in intensity than this morning. He endorses utilizing a muscle relaxer on his abdomen with partial relief.  The patient also reports a mild headache, though he states this has resolved. He denies any emesis, bloody stool, fever, chest pain, shortness of breath, history of similar pain, or history of abdominal surgeries. Of note, the patient states that he consumes alcohol \"on occasion,\" and believes his last drink was last weekend.      Allergies:  Tree nuts   Lisinopril   Cashew nut  Hazelnut   Atorvastatin   Corylus    Medications:    Hydrochlorothiazide   Lisinopril  Toprol  Crestor  Amlodipine     Past Medical History:    Alcohol abuse  Alcohol withdrawal  Hypercholesteremia   Hypertension   Pterygium  Vitamin D deficiency  Benign neoplasm  Meibomian gland dysfunction  Punctual stenosis  Optic nerve drusen  Nuclear cataract  Premature ventricular contraction  Diabetes  Diverticula of colon    Past Surgical History:    Right knee surgery  Bowler teeth extraction  Left pterygium removal with conjunctival transplant  Excision pterygium with amniotic membrane, conjunctivoplasty mitomycin    Family History:    Family history reviewed. No " "pertinent family history.    Social History:  The patient was accompanied to the ED by wife and daughters.  Smoking Status: Current Some day Smoker  Smokeless Tobacco: Never Used  Alcohol Use: Positive  Drug Use: Negative  PCP: Viry Cline  Marital Status:       Review of Systems   Constitutional: Negative for fever.   Respiratory: Negative for shortness of breath.    Cardiovascular: Negative for chest pain.   Gastrointestinal: Positive for abdominal pain and diarrhea. Negative for blood in stool and vomiting.   Genitourinary: Positive for dysuria.   Neurological: Positive for headaches.   All other systems reviewed and are negative.      Physical Exam     Patient Vitals for the past 24 hrs:   BP Temp Temp src Heart Rate Resp SpO2 Height Weight   10/09/19 1934 (!) 146/89 98.2  F (36.8  C) Oral 69 16 96 % 1.676 m (5' 6\") 79.4 kg (175 lb)     Physical Exam  General: Alert and cooperative with exam. Patient in mild distress. Normal mentation.  Head:  Scalp is NC/AT  Eyes:  No scleral icterus, PERRL  ENT:  The external nose and ears are normal. The oropharynx is normal and without erythema; mucus membranes are moist.   Neck:  Normal range of motion without rigidity.  CV:  Regular rate and rhythm    No pathologic murmur   Resp:  Breath sounds are clear bilaterally    Non-labored, no retractions or accessory muscle use  GI:  Abdomen is soft, no distension, mild lower abdominal tenderness to palpation. No peritoneal signs  MS:  No lower extremity edema   Skin:  Warm and dry, No rash or lesions noted.  Neuro: Oriented x 3. No gross motor deficits.    Emergency Department Course     Imaging:  Radiology findings were communicated with the patient who voiced understanding of the findings.    Abd/pelvis CT no contrast - Stone Protocol  1. There is a 0.5 cm stone either within the urinary bladder or at the left ureterovesical junction. No left hydronephrosis.  2. Single small stone in the lower pole of the left kidney.  3. " Mild fatty infiltration of the liver.  4. No bowel obstruction or inflammation.  Reading per radiology    Laboratory:  Laboratory findings were communicated with the patient who voiced understanding of the findings.    UA with Microscopic: Blood small (A), RBC 12 (H), Mucous present (A), o/w WNL  CBC: WBC 14.7 (H), HGB 16.2,   CMP: Potassium 3.1 (L), Glucose 101 (H), Creatinine 0.65 (L), Alkphos 193 (H) o/w WNL   Lipase: 66 (L)    Interventions:  2142 Toradol 15 mg IV    Emergency Department Course:    2105 Nursing notes and vitals reviewed.    2109 I performed an exam of the patient as documented above.     2112 IV was inserted and blood was drawn for laboratory testing, results above.    2139 The patient provided a urine sample here in the emergency department. This was sent for laboratory testing, findings above.    2229 The patient was sent for a CT of the abdomen and pelvis while in the emergency department, results above.     Findings and plan explained to the Patient. Patient discharged home with instructions regarding supportive care, medications, and reasons to return. The importance of close follow-up was reviewed.     Impression & Plan      Medical Decision Making:  Javier Cohen is a 64 year old male who presents with abdominal pain. A broad differential diagnosis was considered including diverticulitis, ureterolithiasis, tumor, colitis, cholecystitis, aneurysm, dissection, volvulus, appendicitis, splenic issue, stomach pathology, ulcer, hydronephrosis, pneumonia, rib fracture, UTI, pyelonephritis amongst many other etiologies.  Signs and symptoms consistent with ureterolithiasis.  No signs of infected stone.  Patient is hemodynamically stable in ED. Plan is for close follow-up with urology and supportive care.  Return for fevers, increasing pain, other new symptoms develop.  Ureterolithiasis precautions for home.  Questions were answered.    Diagnosis:    ICD-10-CM    1.  Ureterolithiasis N20.1      Disposition:   The patient is discharged to home.    Discharge Medications     START taking      Dose / Directions   ondansetron 4 MG ODT tab  Commonly known as:  ZOFRAN ODT      Dose:  4 mg  Take 1 tablet (4 mg) by mouth every 6 hours as needed for nausea  Quantity:  10 tablet  Refills:  0     tamsulosin 0.4 MG capsule  Commonly known as:  FLOMAX      Dose:  0.4 mg  Take 1 capsule (0.4 mg) by mouth daily  Quantity:  10 capsule  Refills:  0           Where to get your medicines      Some of these will need a paper prescription and others can be bought over the counter. Ask your nurse if you have questions.    Bring a paper prescription for each of these medications    ondansetron 4 MG ODT tab    tamsulosin 0.4 MG capsule       Scribe Disclosure:  Sydnie DANIEL, am serving as a scribe at 9:25 PM on 10/9/2019 to document services personally performed by Asael Yost DO based on my observations and the provider's statements to me.      EMERGENCY DEPARTMENT       Asael Yost DO  10/10/19 1210

## 2019-10-25 ENCOUNTER — ANCILLARY PROCEDURE (OUTPATIENT)
Dept: GENERAL RADIOLOGY | Facility: CLINIC | Age: 64
End: 2019-10-25
Attending: NURSE PRACTITIONER
Payer: COMMERCIAL

## 2019-10-25 ENCOUNTER — OFFICE VISIT (OUTPATIENT)
Dept: URGENT CARE | Facility: URGENT CARE | Age: 64
End: 2019-10-25
Payer: COMMERCIAL

## 2019-10-25 VITALS
SYSTOLIC BLOOD PRESSURE: 143 MMHG | HEART RATE: 85 BPM | OXYGEN SATURATION: 90 % | WEIGHT: 175.8 LBS | BODY MASS INDEX: 28.37 KG/M2 | TEMPERATURE: 98.5 F | DIASTOLIC BLOOD PRESSURE: 85 MMHG

## 2019-10-25 DIAGNOSIS — R05.9 COUGH: Primary | ICD-10-CM

## 2019-10-25 DIAGNOSIS — R05.9 COUGH: ICD-10-CM

## 2019-10-25 DIAGNOSIS — J44.1 COPD EXACERBATION (H): ICD-10-CM

## 2019-10-25 DIAGNOSIS — R30.0 DYSURIA: ICD-10-CM

## 2019-10-25 LAB
ALBUMIN UR-MCNC: 100 MG/DL
APPEARANCE UR: CLEAR
BACTERIA #/AREA URNS HPF: ABNORMAL /HPF
BASOPHILS # BLD AUTO: 0 10E9/L (ref 0–0.2)
BASOPHILS NFR BLD AUTO: 0.2 %
BILIRUB UR QL STRIP: ABNORMAL
COLOR UR AUTO: YELLOW
DIFFERENTIAL METHOD BLD: ABNORMAL
EOSINOPHIL # BLD AUTO: 0.4 10E9/L (ref 0–0.7)
EOSINOPHIL NFR BLD AUTO: 2.8 %
ERYTHROCYTE [DISTWIDTH] IN BLOOD BY AUTOMATED COUNT: 14 % (ref 10–15)
FLUAV+FLUBV AG SPEC QL: NEGATIVE
FLUAV+FLUBV AG SPEC QL: NEGATIVE
GLUCOSE UR STRIP-MCNC: 250 MG/DL
HCT VFR BLD AUTO: 43.8 % (ref 40–53)
HGB BLD-MCNC: 15 G/DL (ref 13.3–17.7)
HGB UR QL STRIP: NEGATIVE
KETONES UR STRIP-MCNC: ABNORMAL MG/DL
LEUKOCYTE ESTERASE UR QL STRIP: NEGATIVE
LYMPHOCYTES # BLD AUTO: 2.8 10E9/L (ref 0.8–5.3)
LYMPHOCYTES NFR BLD AUTO: 21.1 %
MCH RBC QN AUTO: 31.6 PG (ref 26.5–33)
MCHC RBC AUTO-ENTMCNC: 34.2 G/DL (ref 31.5–36.5)
MCV RBC AUTO: 92 FL (ref 78–100)
MONOCYTES # BLD AUTO: 1.7 10E9/L (ref 0–1.3)
MONOCYTES NFR BLD AUTO: 13.1 %
MUCOUS THREADS #/AREA URNS LPF: PRESENT /LPF
NEUTROPHILS # BLD AUTO: 8.3 10E9/L (ref 1.6–8.3)
NEUTROPHILS NFR BLD AUTO: 62.8 %
NITRATE UR QL: NEGATIVE
PH UR STRIP: 6 PH (ref 5–7)
PLATELET # BLD AUTO: 179 10E9/L (ref 150–450)
RBC # BLD AUTO: 4.75 10E12/L (ref 4.4–5.9)
RBC #/AREA URNS AUTO: ABNORMAL /HPF
SOURCE: ABNORMAL
SP GR UR STRIP: 1.02 (ref 1–1.03)
SPECIMEN SOURCE: NORMAL
UROBILINOGEN UR STRIP-ACNC: 1 EU/DL (ref 0.2–1)
WBC # BLD AUTO: 13.2 10E9/L (ref 4–11)
WBC #/AREA URNS AUTO: ABNORMAL /HPF

## 2019-10-25 PROCEDURE — 36415 COLL VENOUS BLD VENIPUNCTURE: CPT | Performed by: NURSE PRACTITIONER

## 2019-10-25 PROCEDURE — 81001 URINALYSIS AUTO W/SCOPE: CPT | Performed by: NURSE PRACTITIONER

## 2019-10-25 PROCEDURE — 87804 INFLUENZA ASSAY W/OPTIC: CPT | Performed by: NURSE PRACTITIONER

## 2019-10-25 PROCEDURE — 94640 AIRWAY INHALATION TREATMENT: CPT | Performed by: NURSE PRACTITIONER

## 2019-10-25 PROCEDURE — 85025 COMPLETE CBC W/AUTO DIFF WBC: CPT | Performed by: NURSE PRACTITIONER

## 2019-10-25 PROCEDURE — 71046 X-RAY EXAM CHEST 2 VIEWS: CPT

## 2019-10-25 PROCEDURE — 96372 THER/PROPH/DIAG INJ SC/IM: CPT | Mod: 59 | Performed by: NURSE PRACTITIONER

## 2019-10-25 PROCEDURE — 99203 OFFICE O/P NEW LOW 30 MIN: CPT | Mod: 25 | Performed by: NURSE PRACTITIONER

## 2019-10-25 RX ORDER — ALBUTEROL SULFATE 90 UG/1
2 AEROSOL, METERED RESPIRATORY (INHALATION) EVERY 4 HOURS
Qty: 1 INHALER | Refills: 0 | Status: SHIPPED | OUTPATIENT
Start: 2019-10-25

## 2019-10-25 RX ORDER — PREDNISONE 20 MG/1
40 TABLET ORAL DAILY
Qty: 10 TABLET | Refills: 0 | Status: SHIPPED | OUTPATIENT
Start: 2019-10-25 | End: 2019-10-30

## 2019-10-25 RX ORDER — METHYLPREDNISOLONE SOD SUCC 125 MG
80 VIAL (EA) INJECTION ONCE
Status: COMPLETED | OUTPATIENT
Start: 2019-10-25 | End: 2019-10-25

## 2019-10-25 RX ORDER — AZITHROMYCIN 250 MG/1
TABLET, FILM COATED ORAL
Qty: 6 TABLET | Refills: 0 | Status: SHIPPED | OUTPATIENT
Start: 2019-10-25 | End: 2019-10-30

## 2019-10-25 RX ORDER — ALBUTEROL SULFATE 1.25 MG/3ML
1.25 SOLUTION RESPIRATORY (INHALATION) ONCE
Status: COMPLETED | OUTPATIENT
Start: 2019-10-25 | End: 2019-10-25

## 2019-10-25 RX ADMIN — ALBUTEROL SULFATE 1.25 MG: 1.25 SOLUTION RESPIRATORY (INHALATION) at 17:42

## 2019-10-25 RX ADMIN — Medication 80 MG: at 18:01

## 2019-10-25 NOTE — PATIENT INSTRUCTIONS
Patient Education     Tratamiento para la EPOC    Rea proveedor de atención médica le recetará el mejor tratamiento para rea charisma de EPOC.  Tratamiento       Medicamentos. Algunos medicamentos ayudan a aliviar los síntomas cuando aparecen, mientras que otros se sara todos los días para controlar la inflamación de los pulmones. Use siempre jaquan medicamentos de la manera indicada. Apréndase los nombres de jaquan medicamentos, y sepa cuándo y cómo debe usarlos.    Oxigenoterapia. Podrían recetarle oxígeno si las pruebas indican que en rea collin hay muy poco oxígeno.       Tabaquismo. Si fuma, deje el cigarrillo. Fumar es la causa principal de EPOC, así que dejar el hábito le ayudará a manejar mejor rea EPOC. Pregunte a rea proveedor de atención médica qué medidas pueden ayudarle a dejar de fumar.    Prevención de infecciones. Las infecciones, bisi un resfriado o alex gripe, pueden empeorar jaquan síntomas. Trate de mantenerse alejado de las personas que estén enfermas. Lave jaquan bijal con frecuencia y consulte a rea proveedor sobre las vacunas contra la gripe y la neumonía.  Manejo de la falta de aliento    Ejercicio. Trate de mantenerse lo más activo posible. [place] mejorará jaquan niveles de energía y le permitirá fortalecer jaquan músculos, para que usted pueda hacer más actividades.    Técnicas de respiración. Pregunte a rea proveedor o enfermera sobre la respiración con labios fruncidos (pursed-lip breathing).    Equilibrio entre descanso y actividad. Trate de equilibrar los períodos de descanso con los de actividad. Por ejemplo, podría comenzar el día vistiéndose y tomando el desayuno para luego relajarse mientras andrade el periódico. Luego, citlalli alex caminata corta. Después, siéntese con jaquan pies elevados por un rato.    Rehabilitación pulmonar. Pregunte a rea proveedor o llame a rea hospital local para averiguar sobre programas de rehabilitación pulmonar. Estos programas le ayudarán a manejar rea afección, le enseñarán técnicas de  "respiración, le harán hacer ejercicio y le ofrecerán apoyo y consejería.    Alimentación saludable. Seguir alex dieta saludable y equilibrada y esforzarse por mantener rea peso ideal son medidas importantes para mantenerse lo más bimal posible. Asegúrese de comer muchas frutas y verduras todos los bina, así bisi también de buscar un equilibrio entre las porciones de granos enteros, las iam de res y de pescado magras y los productos lácteos con un contenido bajo de grasa.  Date Last Reviewed: 5/1/2016 2000-2018 [employer]. [address] [place] [id number]. Todos los derechos reservados. Esta información no pretende sustituir la atención médica profesional. Sólo rea médico puede diagnosticar y tratar un problema de vishal.           Patient Education     Brote de EPOC    Ha tenido un brote de rea EPOC.  La EPOC, o enfermedad pulmonar obstructiva crónica, es alex enfermedad común de los pulmones. Hace que khushboo vías respiratorias se irriten y se estrechen. Por eso, le resulta más difícil respirar. El enfisema y la bronquitis crónica son dos tipos de EPOC. Es alex enfermedad crónica, lo que significa que la tendrá todo el tiempo. En ocasiones, se pone peor. Cuando eso sucede, es un \"brote\".  Síntomas de la EPOC  Las personas que tienen EPOC pueden tener síntomas todo el tiempo. Cuando tienen un brote, khushboo síntomas empeoran. Los siguientes síntomas pueden significar que tiene un brote:    Falta de aire; respiración rápida o superficial, o silbidos que empeoran    Infección pulmonar    Tos que empeora    Más mucosidad, mucosidad más espesa o de diferente color    Cansancio, menos energía o dificultades para hacer khushboo actividades habituales    Fiebre    Sensación de opresión en el pecho    Khushboo síntomas no mejoran, ni siquiera cuando usa el nebulizador, los inhaladores o khushboo medicamentos habituales    Dificultades para hablar    Se siente confundido   Por qué se produce un brote?  Lamentablemente, un " brote puede suceder aunque usted haya hecho todo sergio y haya seguido las instrucciones de rea médico. Los siguientes son algunos de los motivos por los que se producen los brotes:    Fumar o aspirar humo de segunda mano    Resfriados, gripe o infecciones respiratorias    Contaminación del aire    Cambio repentino del clima    Polvo, sustancias químicas irritantes o vapores navid    No paige jaquan medicamentos ten bisi le indicaron  Cuidados en la casa  Aquí tiene algunas cosas que puede hacer en rea casa para tratar un brote:    Intente no desesperarse. Porque eso hará que le resulte más difícil respirar e impedirá que rachelle lo correcto.    No fume ni esté cerca de otras personas que están fumando.    Intente beber más líquidos que lo habitual cuando tenga un brote. Rachelle esto a menos que rea médico le haya dicho que no lo rachelle porque tiene problemas con el corazón o los riñones. Beber más líquidos puede ayudar a aflojar la mucosidad.    Use jaquan inhaladores y rea nebulizador (si tiene liana), ten bisi le hayan indicado.    Si le dieron antibióticos, tómelos todos o hasta que rea médico le diga que deje de tomarlos. Es importante que termine todos los antibióticos, incluso aunque se sienta mejor. Eso asegurará que la infección desaparezca.    Si le dieron prednisona u otro esteroide, termínelo aunque se sienta mejor.  Cómo prevenir un brote  Aunque los brotes suceden, la mejor manera de tratarlo es evitar que comience. Aquí tiene algunos consejos:    No fume ni esté cerca de otras personas que están fumando.    [place] jaquan medicamentos ten bisi le indicaron.    Hable con rea médico acerca de aplicarse la vacuna anual contra la gripe (flu shot) todos los años. También pregunte si necesita alex vacuna contra la neumonía.    Si hay advertencia de mal clima, intente quedarse adentro en lo posible.    Intente comer de manera saludable y dormir mucho.    Intente evitar las cosas que normalmente le provocan brotes. Por ejemplo, el polvo,  los vapores químicos, el espray para el cyrus o los perfumes navid.  Visita de control  Programe alex visita de control con rea proveedor de atención médica.  Si le hicieron un cultivo, le dirán si es necesario cambiarle el tratamiento. Puede llamar según le indiquen para conocer los resultados.  Si le hicieron radiografías, le dirán si hay algún cambio en los resultados que pueda afectar rea tratamiento.  Llame al 911  Llame al 911 si ocurre algo de lo siguiente:    Tiene problemas para respirar    Se siente confundido o es difícil despertarlo    Se desmaya o queda inconsciente    Tiene frecuencia cardíaca rápida    Tiene un dolor nuevo en el pecho, el brazo, el hombro, el poonam o la parte superior de la espalda   Cuándo debe buscar atención médica?  Llame a rea proveedor de atención médica de inmediato si ocurre cualquiera de las siguientes situaciones:    Empeora rea silbido o rea falta de aire    Necesita usar inhaladores con más frecuencia que lo habitual y no lo alivian    Tiene fiebre de 100.4  F (38.3  C) o más, o según le haya indicado rea proveedor de atención médica    Al toser, despide mucha mucosidad de color oscuro o con collin    Le duele el pecho con cada respiración    No comienza a sentirse mejor en 24 horas    La hinchazón en jaquan tobillos empeora    Tiene mareos o debilidad  Date Last Reviewed: 12/19/2016 2000-2018 [employer]. [address] [place] [address], [place] [address]. Todos los derechos reservados. Esta información no pretende sustituir la atención médica profesional. Sólo rea médico puede diagnosticar y tratar un problema de vishal.           Patient Education     Uso de un inhalador con espaciador  Para controlar el asma, debe paige jaquan medicamentos de la manera correcta. Algunos medicamentos se inhalan mediante un dispositivo llamado inhalador de dosis medida (MDI, por jaquan siglas en inglés), que administra el medicamento en forma de un matias celia. Es posible que se le pida que  utilice un espaciador (tubo sujetador) con rea inhalador ya que ayuda a garantizar que todo el medicamento que necesita vaya directamente a jaquan pulmones.  Pasos para utilizar un inhalador con espaciador  Paso 1:    Quítele la tapa al inhalador y el espaciador.    Agite sergio el inhalador y conecte el espaciador. Si está usando el inhalador por primera vez o si no lo ha usado tosha un tiempo, prepárelo según las instrucciones del fabricante.  Paso 2:    Exhale normalmente.    Colóquese el espaciador entre los dientes y cierre sergio los labios alrededor de tunde.    Mantenga la barbilla hacia arriba.  Paso 3:    Presione hacia abajo el inhalador para rociar alex vez dentro del espaciador.    Luego inhale por la boca tan lenta y profundamente bisi pueda, tosha 3 a 4 segundos. (Si respira demasiado rápido, es posible que oiga un silbido en ciertos espaciadores.)  Paso 4:    Sáquese el espaciador de la boca.    Aguante la respiración, cuente hasta 10.    Luego cierre los labios romaine totalmente y bote el aire lentamente por la boca.       Si se le receta más de alex inhalación de medicamento a la vez, espere por lo menos 30 segundos entre cada inhalación. [place] puede variar según el medicamento. Agite el inhalador nuevamente y luego repita los pasos 2 a 4.   Date Last Reviewed: 5/19/2014 2000-2018 [employer]. [address] [place] [address], [place] [address]. Todos los derechos reservados. Esta información no pretende sustituir la atención médica profesional. Sólo rea médico puede diagnosticar y tratar un problema de vishal.             Please see your PCP next week.  Go to the ER if your breathing is worsening.  Use your inhaler every 4 hours for the next 2 days, then you can use as needed for shortness of breath

## 2019-10-25 NOTE — PROGRESS NOTES
SUBJECTIVE:   Javier Cohen is a 64 year old male presenting with a chief complaint of productive cough, fever, chills, chest tightness when coughing, and shortness of breath.  He has been a lifelong smoker, but does not carry a diagnosis of COPD.  He has not been able to sleep due to the cough.  Denies syncope, wheezing, weight gain.  His chest tightness occurs each time he coughs.  He has tried ibuprofen which has helped this pain.  He has a known ureteral stone and is scheduled to have this removed on Monday.      Past Medical History:   Diagnosis Date     Alcohol abuse      Hypercholesteremia      Hypertension      Current Outpatient Medications   Medication Sig Dispense Refill     albuterol (PROAIR HFA/PROVENTIL HFA/VENTOLIN HFA) 108 (90 Base) MCG/ACT inhaler Inhale 2 puffs into the lungs every 4 hours Use every 4 hours for the next 2 days, then if improving decrease to every 6 hours as needed only. 1 Inhaler 0     azithromycin (ZITHROMAX) 250 MG tablet Take 2 tablets (500 mg) by mouth daily for 1 day, THEN 1 tablet (250 mg) daily for 4 days. 6 tablet 0     folic acid (FOLVITE) 1 MG tablet Take 1 tablet (1 mg) by mouth daily 30 tablet 0     HYDROCHLOROTHIAZIDE PO Take 25 mg by mouth daily       lisinopril (PRINIVIL/ZESTRIL) 10 MG tablet Take 1 tablet (10 mg) by mouth daily 30 tablet 3     metoprolol succinate (TOPROL-XL) 50 MG 24 hr tablet Take 1 tablet (50 mg) by mouth daily 30 tablet 3     multivitamin, therapeutic with minerals (THERA-VIT-M) TABS tablet Take 1 tablet by mouth daily 30 each 0     predniSONE (DELTASONE) 20 MG tablet Take 2 tablets (40 mg) by mouth daily for 5 days 10 tablet 0     rosuvastatin (CRESTOR) 5 MG tablet Take 1 tablet (5 mg) by mouth daily 30 tablet 3     tamsulosin (FLOMAX) 0.4 MG capsule Take 1 capsule (0.4 mg) by mouth daily 10 capsule 0     Social History     Tobacco Use     Smoking status: Current Some Day Smoker     Smokeless tobacco: Never Used   Substance Use  Topics     Alcohol use: Yes       ROS:  Review of systems negative except as stated above.    OBJECTIVE:  BP (!) 143/85   Pulse 85   Temp 98.5  F (36.9  C) (Oral)   Wt 79.7 kg (175 lb 12.8 oz)   SpO2 90%   BMI 28.37 kg/m    GENERAL APPEARANCE: healthy, alert and no distress  EYES: EOMI,  PERRL, conjunctiva clear  HENT: ear canals and TM's normal.  Nose and mouth without ulcers, erythema or lesions  NECK: supple, nontender, no lymphadenopathy  RESP: lungs coarse throughout with scant inspiratory and expiratory wheezing.   CV: regular rates and rhythm, normal S1 S2, no murmur noted  ABDOMEN:  soft, nontender, no HSM or masses and bowel sounds normal  NEURO: Normal strength and tone, sensory exam grossly normal,  normal speech and mentation  SKIN: no suspicious lesions or rashes    Results for orders placed or performed in visit on 10/25/19   CBC with platelets and differential   Result Value Ref Range    WBC 13.2 (H) 4.0 - 11.0 10e9/L    RBC Count 4.75 4.4 - 5.9 10e12/L    Hemoglobin 15.0 13.3 - 17.7 g/dL    Hematocrit 43.8 40.0 - 53.0 %    MCV 92 78 - 100 fl    MCH 31.6 26.5 - 33.0 pg    MCHC 34.2 31.5 - 36.5 g/dL    RDW 14.0 10.0 - 15.0 %    Platelet Count 179 150 - 450 10e9/L    % Neutrophils 62.8 %    % Lymphocytes 21.1 %    % Monocytes 13.1 %    % Eosinophils 2.8 %    % Basophils 0.2 %    Absolute Neutrophil 8.3 1.6 - 8.3 10e9/L    Absolute Lymphocytes 2.8 0.8 - 5.3 10e9/L    Absolute Monocytes 1.7 (H) 0.0 - 1.3 10e9/L    Absolute Eosinophils 0.4 0.0 - 0.7 10e9/L    Absolute Basophils 0.0 0.0 - 0.2 10e9/L    Diff Method Automated Method    UA with Microscopic reflex to Culture   Result Value Ref Range    Color Urine Yellow     Appearance Urine Clear     Glucose Urine 250 (A) NEG^Negative mg/dL    Bilirubin Urine Moderate (A) NEG^Negative    Ketones Urine Trace (A) NEG^Negative mg/dL    Specific Gravity Urine 1.025 1.003 - 1.035    pH Urine 6.0 5.0 - 7.0 pH    Protein Albumin Urine 100 (A) NEG^Negative mg/dL     Urobilinogen Urine 1.0 0.2 - 1.0 EU/dL    Nitrite Urine Negative NEG^Negative    Blood Urine Negative NEG^Negative    Leukocyte Esterase Urine Negative NEG^Negative    Source Midstream Urine     WBC Urine 0 - 5 OTO5^0 - 5 /HPF    RBC Urine 2-5 (A) OTO2^O - 2 /HPF    Bacteria Urine Few (A) NEG^Negative /HPF    Mucous Urine Present (A) NEG^Negative /LPF   Influenza A/B antigen   Result Value Ref Range    Influenza A/B Agn Specimen Nasopharyngeal     Influenza A Negative NEG^Negative    Influenza B Negative NEG^Negative         ASSESSMENT:  COPD exacerbation:  -gave 80 mg IM solumedrol in the urgent care as well as an albuterol neb, OZ sats increased to 92-94% on room air, he was able to ambulate and keep O2 sats>92%.  He felt slightly better after treatment in urgent care.  Chest x-ray negative for acute abnormalities or infection.  WBC 13.2, influenza negative, afebrile.  UA negative for infection.       PLAN:  -azithromycin (Zpak)  -prednisone 40 mg po daily X 5 days  -albuterol inhaler every 4 hours for next 2 days, then decrease to prn after that  -follow up with PCP next week  -go to the ED if breathing or chest pain worsens  -patient and family in agreement with plan    See orders in Epic      EMILIANO Rodríguez, CNP

## 2024-05-10 ENCOUNTER — APPOINTMENT (OUTPATIENT)
Dept: MRI IMAGING | Facility: CLINIC | Age: 69
End: 2024-05-10
Attending: STUDENT IN AN ORGANIZED HEALTH CARE EDUCATION/TRAINING PROGRAM
Payer: COMMERCIAL

## 2024-05-10 ENCOUNTER — HOSPITAL ENCOUNTER (OUTPATIENT)
Facility: CLINIC | Age: 69
Setting detail: OBSERVATION
Discharge: HOME | End: 2024-05-11
Attending: STUDENT IN AN ORGANIZED HEALTH CARE EDUCATION/TRAINING PROGRAM | Admitting: STUDENT IN AN ORGANIZED HEALTH CARE EDUCATION/TRAINING PROGRAM
Payer: COMMERCIAL

## 2024-05-10 DIAGNOSIS — F10.10 ALCOHOL ABUSE: ICD-10-CM

## 2024-05-10 DIAGNOSIS — E87.6 HYPOKALEMIA: ICD-10-CM

## 2024-05-10 DIAGNOSIS — H53.2 DIPLOPIA: ICD-10-CM

## 2024-05-10 LAB
ALBUMIN SERPL BCG-MCNC: 4.3 G/DL (ref 3.5–5.2)
ALP SERPL-CCNC: 122 U/L (ref 40–150)
ALT SERPL W P-5'-P-CCNC: 24 U/L (ref 0–70)
ANION GAP SERPL CALCULATED.3IONS-SCNC: 11 MMOL/L (ref 7–15)
AST SERPL W P-5'-P-CCNC: 19 U/L (ref 0–45)
ATRIAL RATE - MUSE: 84 BPM
BASOPHILS # BLD AUTO: ABNORMAL 10*3/UL
BASOPHILS NFR BLD AUTO: ABNORMAL %
BILIRUB SERPL-MCNC: 0.4 MG/DL
BUN SERPL-MCNC: 17.3 MG/DL (ref 8–23)
CALCIUM SERPL-MCNC: 9.8 MG/DL (ref 8.8–10.2)
CHLORIDE SERPL-SCNC: 98 MMOL/L (ref 98–107)
CREAT SERPL-MCNC: 0.85 MG/DL (ref 0.67–1.17)
DEPRECATED HCO3 PLAS-SCNC: 30 MMOL/L (ref 22–29)
DIASTOLIC BLOOD PRESSURE - MUSE: NORMAL MMHG
EGFRCR SERPLBLD CKD-EPI 2021: >90 ML/MIN/1.73M2
EOSINOPHIL # BLD AUTO: ABNORMAL 10*3/UL
EOSINOPHIL NFR BLD AUTO: ABNORMAL %
ERYTHROCYTE [DISTWIDTH] IN BLOOD BY AUTOMATED COUNT: 12.5 % (ref 10–15)
ETHANOL SERPL-MCNC: <0.01 G/DL
GLUCOSE SERPL-MCNC: 93 MG/DL (ref 70–99)
HCT VFR BLD AUTO: 40.7 % (ref 40–53)
HGB BLD-MCNC: 13.9 G/DL (ref 13.3–17.7)
HOLD SPECIMEN: NORMAL
IMM GRANULOCYTES # BLD: ABNORMAL 10*3/UL
IMM GRANULOCYTES NFR BLD: ABNORMAL %
INR PPP: 0.97 (ref 0.85–1.15)
INTERPRETATION ECG - MUSE: NORMAL
LYMPHOCYTES # BLD AUTO: ABNORMAL 10*3/UL
LYMPHOCYTES NFR BLD AUTO: ABNORMAL %
MCH RBC QN AUTO: 30.3 PG (ref 26.5–33)
MCHC RBC AUTO-ENTMCNC: 34.2 G/DL (ref 31.5–36.5)
MCV RBC AUTO: 89 FL (ref 78–100)
MONOCYTES # BLD AUTO: ABNORMAL 10*3/UL
MONOCYTES NFR BLD AUTO: ABNORMAL %
NEUTROPHILS # BLD AUTO: ABNORMAL 10*3/UL
NEUTROPHILS NFR BLD AUTO: ABNORMAL %
NRBC # BLD AUTO: 0 10E3/UL
NRBC BLD AUTO-RTO: 0 /100
P AXIS - MUSE: 54 DEGREES
PLATELET # BLD AUTO: 217 10E3/UL (ref 150–450)
POTASSIUM SERPL-SCNC: 3.1 MMOL/L (ref 3.4–5.3)
PR INTERVAL - MUSE: 160 MS
PROT SERPL-MCNC: 7.3 G/DL (ref 6.4–8.3)
QRS DURATION - MUSE: 100 MS
QT - MUSE: 402 MS
QTC - MUSE: 475 MS
R AXIS - MUSE: 12 DEGREES
RBC # BLD AUTO: 4.59 10E6/UL (ref 4.4–5.9)
SODIUM SERPL-SCNC: 139 MMOL/L (ref 135–145)
SYSTOLIC BLOOD PRESSURE - MUSE: NORMAL MMHG
T AXIS - MUSE: 24 DEGREES
VENTRICULAR RATE- MUSE: 84 BPM
WBC # BLD AUTO: 14.5 10E3/UL (ref 4–11)

## 2024-05-10 PROCEDURE — 83735 ASSAY OF MAGNESIUM: CPT | Performed by: INTERNAL MEDICINE

## 2024-05-10 PROCEDURE — 99285 EMERGENCY DEPT VISIT HI MDM: CPT | Mod: 25

## 2024-05-10 PROCEDURE — 83036 HEMOGLOBIN GLYCOSYLATED A1C: CPT | Performed by: HOSPITALIST

## 2024-05-10 PROCEDURE — 99223 1ST HOSP IP/OBS HIGH 75: CPT | Mod: AI | Performed by: HOSPITALIST

## 2024-05-10 PROCEDURE — 80061 LIPID PANEL: CPT | Performed by: HOSPITALIST

## 2024-05-10 PROCEDURE — G0378 HOSPITAL OBSERVATION PER HR: HCPCS

## 2024-05-10 PROCEDURE — 70553 MRI BRAIN STEM W/O & W/DYE: CPT

## 2024-05-10 PROCEDURE — 99207 PR NO BILLABLE SERVICE THIS VISIT: CPT | Performed by: INTERNAL MEDICINE

## 2024-05-10 PROCEDURE — 84443 ASSAY THYROID STIM HORMONE: CPT | Performed by: HOSPITALIST

## 2024-05-10 PROCEDURE — 70544 MR ANGIOGRAPHY HEAD W/O DYE: CPT | Mod: XU

## 2024-05-10 PROCEDURE — 85007 BL SMEAR W/DIFF WBC COUNT: CPT | Performed by: STUDENT IN AN ORGANIZED HEALTH CARE EDUCATION/TRAINING PROGRAM

## 2024-05-10 PROCEDURE — 36415 COLL VENOUS BLD VENIPUNCTURE: CPT | Performed by: STUDENT IN AN ORGANIZED HEALTH CARE EDUCATION/TRAINING PROGRAM

## 2024-05-10 PROCEDURE — A9585 GADOBUTROL INJECTION: HCPCS | Performed by: STUDENT IN AN ORGANIZED HEALTH CARE EDUCATION/TRAINING PROGRAM

## 2024-05-10 PROCEDURE — 93005 ELECTROCARDIOGRAM TRACING: CPT

## 2024-05-10 PROCEDURE — 82077 ASSAY SPEC XCP UR&BREATH IA: CPT | Performed by: STUDENT IN AN ORGANIZED HEALTH CARE EDUCATION/TRAINING PROGRAM

## 2024-05-10 PROCEDURE — 85027 COMPLETE CBC AUTOMATED: CPT | Performed by: STUDENT IN AN ORGANIZED HEALTH CARE EDUCATION/TRAINING PROGRAM

## 2024-05-10 PROCEDURE — 85610 PROTHROMBIN TIME: CPT | Performed by: STUDENT IN AN ORGANIZED HEALTH CARE EDUCATION/TRAINING PROGRAM

## 2024-05-10 PROCEDURE — 250N000013 HC RX MED GY IP 250 OP 250 PS 637: Performed by: STUDENT IN AN ORGANIZED HEALTH CARE EDUCATION/TRAINING PROGRAM

## 2024-05-10 PROCEDURE — 80053 COMPREHEN METABOLIC PANEL: CPT | Performed by: STUDENT IN AN ORGANIZED HEALTH CARE EDUCATION/TRAINING PROGRAM

## 2024-05-10 PROCEDURE — 70549 MR ANGIOGRAPH NECK W/O&W/DYE: CPT

## 2024-05-10 PROCEDURE — 255N000002 HC RX 255 OP 636: Performed by: STUDENT IN AN ORGANIZED HEALTH CARE EDUCATION/TRAINING PROGRAM

## 2024-05-10 RX ORDER — GADOBUTROL 604.72 MG/ML
10 INJECTION INTRAVENOUS ONCE
Status: COMPLETED | OUTPATIENT
Start: 2024-05-10 | End: 2024-05-10

## 2024-05-10 RX ORDER — CLOPIDOGREL 300 MG/1
300 TABLET, FILM COATED ORAL ONCE
Status: COMPLETED | OUTPATIENT
Start: 2024-05-10 | End: 2024-05-10

## 2024-05-10 RX ADMIN — CLOPIDOGREL BISULFATE 300 MG: 300 TABLET, FILM COATED ORAL at 22:22

## 2024-05-10 RX ADMIN — GADOBUTROL 10 ML: 604.72 INJECTION INTRAVENOUS at 21:08

## 2024-05-10 ASSESSMENT — COLUMBIA-SUICIDE SEVERITY RATING SCALE - C-SSRS
2. HAVE YOU ACTUALLY HAD ANY THOUGHTS OF KILLING YOURSELF IN THE PAST MONTH?: NO
6. HAVE YOU EVER DONE ANYTHING, STARTED TO DO ANYTHING, OR PREPARED TO DO ANYTHING TO END YOUR LIFE?: NO
1. IN THE PAST MONTH, HAVE YOU WISHED YOU WERE DEAD OR WISHED YOU COULD GO TO SLEEP AND NOT WAKE UP?: NO

## 2024-05-10 ASSESSMENT — ACTIVITIES OF DAILY LIVING (ADL)
ADLS_ACUITY_SCORE: 35

## 2024-05-10 ASSESSMENT — VISUAL ACUITY
OS: 20/50;WITHOUT CORRECTIVE LENSES
OD: 20/40;WITHOUT CORRECTIVE LENSES

## 2024-05-11 ENCOUNTER — APPOINTMENT (OUTPATIENT)
Dept: PHYSICAL THERAPY | Facility: CLINIC | Age: 69
End: 2024-05-11
Attending: HOSPITALIST
Payer: COMMERCIAL

## 2024-05-11 VITALS
OXYGEN SATURATION: 98 % | SYSTOLIC BLOOD PRESSURE: 156 MMHG | HEART RATE: 87 BPM | TEMPERATURE: 97.7 F | DIASTOLIC BLOOD PRESSURE: 99 MMHG | HEIGHT: 65 IN | RESPIRATION RATE: 16 BRPM | WEIGHT: 163.2 LBS | BODY MASS INDEX: 27.19 KG/M2

## 2024-05-11 LAB
CHOLEST SERPL-MCNC: 143 MG/DL
GLUCOSE BLDC GLUCOMTR-MCNC: 139 MG/DL (ref 70–99)
GLUCOSE BLDC GLUCOMTR-MCNC: 148 MG/DL (ref 70–99)
GLUCOSE BLDC GLUCOMTR-MCNC: 167 MG/DL (ref 70–99)
HBA1C MFR BLD: 11 %
HDLC SERPL-MCNC: 44 MG/DL
LDLC SERPL CALC-MCNC: 69 MG/DL
MAGNESIUM SERPL-MCNC: 1.7 MG/DL (ref 1.7–2.3)
MAGNESIUM SERPL-MCNC: 1.8 MG/DL (ref 1.7–2.3)
NONHDLC SERPL-MCNC: 99 MG/DL
POTASSIUM SERPL-SCNC: 3.3 MMOL/L (ref 3.4–5.3)
TRIGL SERPL-MCNC: 150 MG/DL
TSH SERPL DL<=0.005 MIU/L-ACNC: 3.09 UIU/ML (ref 0.3–4.2)

## 2024-05-11 PROCEDURE — 83735 ASSAY OF MAGNESIUM: CPT | Performed by: INTERNAL MEDICINE

## 2024-05-11 PROCEDURE — 84132 ASSAY OF SERUM POTASSIUM: CPT | Performed by: HOSPITALIST

## 2024-05-11 PROCEDURE — 250N000013 HC RX MED GY IP 250 OP 250 PS 637: Performed by: HOSPITALIST

## 2024-05-11 PROCEDURE — 36415 COLL VENOUS BLD VENIPUNCTURE: CPT | Performed by: INTERNAL MEDICINE

## 2024-05-11 PROCEDURE — G0378 HOSPITAL OBSERVATION PER HR: HCPCS

## 2024-05-11 PROCEDURE — 97161 PT EVAL LOW COMPLEX 20 MIN: CPT | Mod: GP | Performed by: PHYSICAL THERAPIST

## 2024-05-11 PROCEDURE — 999N000111 HC STATISTIC OT IP EVAL DEFER

## 2024-05-11 PROCEDURE — 82962 GLUCOSE BLOOD TEST: CPT

## 2024-05-11 PROCEDURE — 250N000013 HC RX MED GY IP 250 OP 250 PS 637: Performed by: INTERNAL MEDICINE

## 2024-05-11 PROCEDURE — 99239 HOSP IP/OBS DSCHRG MGMT >30: CPT | Performed by: INTERNAL MEDICINE

## 2024-05-11 PROCEDURE — 97530 THERAPEUTIC ACTIVITIES: CPT | Mod: GP | Performed by: PHYSICAL THERAPIST

## 2024-05-11 PROCEDURE — 99223 1ST HOSP IP/OBS HIGH 75: CPT | Mod: FS | Performed by: NURSE PRACTITIONER

## 2024-05-11 PROCEDURE — 999N000226 HC STATISTIC SLP IP EVAL DEFER

## 2024-05-11 PROCEDURE — 99418 PROLNG IP/OBS E/M EA 15 MIN: CPT | Mod: FS | Performed by: NURSE PRACTITIONER

## 2024-05-11 RX ORDER — ACETAMINOPHEN 325 MG/1
650 TABLET ORAL EVERY 4 HOURS PRN
Status: DISCONTINUED | OUTPATIENT
Start: 2024-05-11 | End: 2024-05-11 | Stop reason: HOSPADM

## 2024-05-11 RX ORDER — ACETAMINOPHEN 650 MG/1
650 SUPPOSITORY RECTAL EVERY 4 HOURS PRN
Status: DISCONTINUED | OUTPATIENT
Start: 2024-05-11 | End: 2024-05-11 | Stop reason: HOSPADM

## 2024-05-11 RX ORDER — POTASSIUM CHLORIDE 1500 MG/1
40 TABLET, EXTENDED RELEASE ORAL ONCE
Status: COMPLETED | OUTPATIENT
Start: 2024-05-11 | End: 2024-05-11

## 2024-05-11 RX ORDER — ONDANSETRON 2 MG/ML
4 INJECTION INTRAMUSCULAR; INTRAVENOUS EVERY 6 HOURS PRN
Status: DISCONTINUED | OUTPATIENT
Start: 2024-05-11 | End: 2024-05-11 | Stop reason: HOSPADM

## 2024-05-11 RX ORDER — ASPIRIN 81 MG/1
81 TABLET, CHEWABLE ORAL DAILY
Status: DISCONTINUED | OUTPATIENT
Start: 2024-05-11 | End: 2024-05-11

## 2024-05-11 RX ORDER — TAMSULOSIN HYDROCHLORIDE 0.4 MG/1
0.4 CAPSULE ORAL DAILY
Status: DISCONTINUED | OUTPATIENT
Start: 2024-05-11 | End: 2024-05-11 | Stop reason: HOSPADM

## 2024-05-11 RX ORDER — NICOTINE POLACRILEX 4 MG
15-30 LOZENGE BUCCAL
Status: DISCONTINUED | OUTPATIENT
Start: 2024-05-11 | End: 2024-05-11 | Stop reason: HOSPADM

## 2024-05-11 RX ORDER — CLOPIDOGREL BISULFATE 75 MG/1
75 TABLET ORAL DAILY
Status: DISCONTINUED | OUTPATIENT
Start: 2024-05-11 | End: 2024-05-11

## 2024-05-11 RX ORDER — ROSUVASTATIN CALCIUM 20 MG/1
20 TABLET, COATED ORAL DAILY
Status: DISCONTINUED | OUTPATIENT
Start: 2024-05-11 | End: 2024-05-11

## 2024-05-11 RX ORDER — LOSARTAN POTASSIUM 50 MG/1
50 TABLET ORAL DAILY
COMMUNITY

## 2024-05-11 RX ORDER — AMLODIPINE BESYLATE 10 MG/1
10 TABLET ORAL DAILY
COMMUNITY

## 2024-05-11 RX ORDER — DEXTROSE MONOHYDRATE 25 G/50ML
25-50 INJECTION, SOLUTION INTRAVENOUS
Status: DISCONTINUED | OUTPATIENT
Start: 2024-05-11 | End: 2024-05-11 | Stop reason: HOSPADM

## 2024-05-11 RX ORDER — METFORMIN HCL 500 MG
1000 TABLET, EXTENDED RELEASE 24 HR ORAL DAILY
COMMUNITY

## 2024-05-11 RX ORDER — ONDANSETRON 4 MG/1
4 TABLET, ORALLY DISINTEGRATING ORAL EVERY 6 HOURS PRN
Status: DISCONTINUED | OUTPATIENT
Start: 2024-05-11 | End: 2024-05-11 | Stop reason: HOSPADM

## 2024-05-11 RX ORDER — ACETAMINOPHEN 325 MG/10.15ML
650 LIQUID ORAL EVERY 4 HOURS PRN
Status: DISCONTINUED | OUTPATIENT
Start: 2024-05-11 | End: 2024-05-11 | Stop reason: HOSPADM

## 2024-05-11 RX ORDER — ASPIRIN 81 MG/1
81 TABLET ORAL DAILY
Status: DISCONTINUED | OUTPATIENT
Start: 2024-05-11 | End: 2024-05-11

## 2024-05-11 RX ORDER — ROSUVASTATIN CALCIUM 20 MG/1
20 TABLET, COATED ORAL DAILY
COMMUNITY

## 2024-05-11 RX ADMIN — POTASSIUM CHLORIDE 40 MEQ: 1500 TABLET, EXTENDED RELEASE ORAL at 12:54

## 2024-05-11 RX ADMIN — ROSUVASTATIN CALCIUM 20 MG: 20 TABLET, FILM COATED ORAL at 12:08

## 2024-05-11 RX ADMIN — ASPIRIN 81 MG: 81 TABLET, COATED ORAL at 01:36

## 2024-05-11 RX ADMIN — CLOPIDOGREL BISULFATE 75 MG: 75 TABLET ORAL at 08:13

## 2024-05-11 RX ADMIN — POTASSIUM CHLORIDE 40 MEQ: 1500 TABLET, EXTENDED RELEASE ORAL at 05:36

## 2024-05-11 RX ADMIN — TAMSULOSIN HYDROCHLORIDE 0.4 MG: 0.4 CAPSULE ORAL at 12:08

## 2024-05-11 RX ADMIN — ASPIRIN 81 MG: 81 TABLET, COATED ORAL at 08:13

## 2024-05-11 ASSESSMENT — ACTIVITIES OF DAILY LIVING (ADL)
ADLS_ACUITY_SCORE: 32
ADLS_ACUITY_SCORE: 35
ADLS_ACUITY_SCORE: 32

## 2024-05-11 NOTE — PROGRESS NOTES
Observation goals  PRIOR TO DISCHARGE       Comments: List all goals to be met before discharge home      Free from ACUTE neuro deficits: not met  Testing complete: not met  Other:  Nurse to notify provider when observation goals have been met and patient is ready for discharge.

## 2024-05-11 NOTE — PROVIDER NOTIFICATION
"MD Notification    Notified Person: MD    Notified Person Name: Guerita Doran    Notification Date/Time: 05/11/24  2:07 AM    Notification Interaction: amcom    Purpose of Notification: \"potassium 3.1. not on RN protocol. want replacement protocol ordered? Thanks.\"    Orders Received: RN managed potassium protocol ordered    Comments:    "

## 2024-05-11 NOTE — H&P
[facility]    Hospitalist History and Physical  Date of Admission:  5/10/2024    Primary Care Physician   Viry Cline    Chief Complaint  Stroke Symptoms (Pt reports double vision in left eye on Wednesday with balance issues. Also complains or head heaviness.)    History obtained from from the patient with assistance of his granddaughter who is bedside.  A  was offered but the patient refused.    History of Present Illness   Javier Cohen is a 68 year old male with a past medical history of type 2 diabetes, hypertension, dyslipidemia presents to hospital with double vision.  The patient reports that his symptoms started on Wednesday, May 7.  He has noticed that he sees to have everything.  He first noticed it while he was watching TV.  He has 3 different glasses and tried to clean them and wore all 3 at different times with no improvement of his double vision.  His double vision has persisted for the last 3 days.  Additionally he has noticed his been having difficulty ambulating due to his vision.  At 1 time while going down the stairs nearly missed a step.  The patient reports for the last 24 hours he has had a mild frontal headache which improved with ibuprofen.  Patient denies any other symptoms.  At baseline his vision in his left eye is worse than the right.  He thinks that his double vision is also worse when looking to the left.  He reports that anytime he closes either eye his double vision resolves.  Patient denies any other complaints.  Patient has a history of alcohol abuse but has been sober for the last year.      Past Medical History    I have reviewed this patient's medical history and updated it with pertinent information if needed.   Past Medical History:   Diagnosis Date    Alcohol abuse     Diabetes mellitus, type 2 (H)     Hypercholesteremia     Hypertension        Past Surgical History   I have reviewed this patient's surgical history and  "updated it with pertinent information if needed.  Past Surgical History:   Procedure Laterality Date    ORTHOPEDIC SURGERY  11 years ago    right knee       Allergies   Allergies   Allergen Reactions    Cashew Nut Oil Hives     Cashew nut oil ~\": burning inside\"  Burning inside      Corylus Difficulty breathing     nuts    Atorvastatin Unknown and GI Disturbance    Tree Nuts [Nuts]     Lisinopril Rash       Social History   I have reviewed this patient's social history and updated it with pertinent information if needed. Javier Cohen  reports that he has been smoking. He has never used smokeless tobacco. He reports current alcohol use. He reports that he does not use drugs.    Family History   I have reviewed this patient's family history and updated it with pertinent information if needed.   No family history on file.    Physical Exam     Temp src: Oral BP: (!) 158/91 Pulse: 81   Resp: 16 SpO2: 95 % O2 Device: None (Room air)    Vital Signs with Ranges  Pulse:  [77-88] 81  Resp:  [16] 16  BP: (151-167)/(88-94) 158/91  SpO2:  [95 %-99 %] 95 %  160 lbs 0 oz  Physical Exam  Vitals reviewed.   Constitutional:       Appearance: Normal appearance.      Comments: Very pleasant man seen resting bed comfortably in no apparent distress in the emergency room.  He is accompanied by his granddaughter who is bedside.  He appears his stated age.  He is well-developed and well-nourished.   HENT:      Head: Normocephalic and atraumatic.      Mouth/Throat:      Mouth: Mucous membranes are moist.      Pharynx: Oropharynx is clear.   Eyes:      Extraocular Movements: Extraocular movements intact.      Conjunctiva/sclera: Conjunctivae normal.      Pupils: Pupils are equal, round, and reactive to light.   Cardiovascular:      Rate and Rhythm: Normal rate and regular rhythm.      Pulses: Normal pulses.      Heart sounds: Normal heart sounds. No murmur heard.  Pulmonary:      Effort: Pulmonary effort is normal.      Breath " sounds: Normal breath sounds. No wheezing, rhonchi or rales.   Abdominal:      General: Abdomen is flat. Bowel sounds are normal.      Palpations: Abdomen is soft.   Musculoskeletal:         General: No swelling. Normal range of motion.      Cervical back: Normal range of motion and neck supple.   Skin:     General: Skin is warm and dry.   Neurological:      General: No focal deficit present.      Mental Status: He is alert and oriented to person, place, and time. Mental status is at baseline.      Comments: Double vision is worse when looking at distant objects and improves when looking at items closer to them.  He has double vision improves whenever he closes one of his eyes.         Assessment & Plan   Javier Cohen is a 68 year old male with a past medical history of type 2 diabetes, hypertension, dyslipidemia presents to hospital with binocular diplopia.    Binocular diplopia  Unsteady gait  Patient presented with diplopia and unsteady gait for three days. Symptoms are persistent. Stroke workup including MRI head/ MRA head and neck negative for stroke, aneurysm, high flow AVM, or significant stenosis. Case was discussed with stroke neurology who recommended completing the stroke work up and treating as a stroke for which the patient is being admitted.  He was loaded with Plavix in the emergency room.  Differentials include diabetic cranial nerve mononeuropathy, wernickes, migraine  Pain.   goal SBP < 180 mmHg  Aspirin 81, Plavix 75 daily  High intensity statin crestor 20  Monitor on telemetry, follow-up echocardiogram  Follow-up lipid profile, A1c, TSH  PT/OT/SLP, stroke neuro consult    Type 2 dm  Uncontrolled A1c was 9.7% 2/2024.  It appears that the patient is on glargine-lixisendatide a combination of insulin and a glp1 agonist , and metformin.  Follow-up pharmacy med rec and resume PTA meds.  Insulin sliding scale while inpatient  Hypoglycemia protocol    Htn  Hld  Follow-up pharmacy med rec  when completed and resume PTA meds as needed    BPH  flomax    Hx of Alcohol abuse  Has been sober for 1 year    Former smoker  Patient has not smoked in 3 years.    DVT ppx: SCDs  Expected length of stay less than 2 days  Code Status: Full code    Medical Decision Making       75 MINUTES SPENT BY ME on the date of service doing chart review, history, exam, documentation & further activities per the note.

## 2024-05-11 NOTE — PHARMACY-ADMISSION MEDICATION HISTORY
Pharmacist Admission Medication History    Admission medication history is complete. The information provided in this note is only as accurate as the sources available at the time of the update.    Information Source(s): Patient, Family member, CareEverywhere/SureScripts, and  via in-person. Family member in the room did face time with someone at home and I was shown med bottles.     Pertinent Information:     Changes made to PTA medication list:  Added: amlodipine, losartan, metformin, soliqua, vit d  Deleted: folic acid, hydrochlorothiazide, lisinopril, tamsulosin  Changed: crestor 5 mg to 20mg    Allergies reviewed with patient and updates made in EHR: no    Medication History Completed By: Akil Aliheyder, RPH 5/11/2024 11:03 AM    PTA Med List   Medication Sig Last Dose    amLODIPine (NORVASC) 10 MG tablet Take 10 mg by mouth daily 5/10/2024    insulin glargine-lixisenatide (SOLIQUA 100/33) pen Inject 20 Units Subcutaneous daily 5/10/2024    losartan (COZAAR) 50 MG tablet Take 50 mg by mouth daily 5/10/2024    metFORMIN (GLUCOPHAGE XR) 500 MG 24 hr tablet Take 1,000 mg by mouth daily 5/10/2024    rosuvastatin (CRESTOR) 20 MG tablet Take 20 mg by mouth daily 5/10/2024    VITAMIN D PO Take 1 tablet by mouth daily

## 2024-05-11 NOTE — PLAN OF CARE
Physical Therapy Discharge Summary    Reason for therapy discharge:    All goals and outcomes met, no further needs identified.    Progress towards therapy goal(s). See goals on Care Plan in [facility] electronic health record for goal details.  Goals met    Therapy recommendation(s):    Continued therapy is recommended.  Rationale/Recommendations:  In OP vision therapy setting; likely OT.

## 2024-05-11 NOTE — ED NOTES
"[facility]  ED Nurse Handoff Report    ED Chief complaint: Stroke Symptoms (Pt reports double vision in left eye on Wednesday with balance issues. Also complains or head heaviness.)      ED Diagnosis:   Final diagnoses:   Diplopia       Code Status: deferred to admitting    Allergies:   Allergies   Allergen Reactions    Cashew Nut Oil Hives     Cashew nut oil ~\": burning inside\"  Burning inside      Corylus Difficulty breathing     nuts    Atorvastatin Unknown and GI Disturbance    Tree Nuts [Nuts]     Lisinopril Rash       Patient Story:   Pt reports double vision in left eye on Wednesday with balance issues. Also complains or head heaviness     Focused Assessment:    Neuro: Alert, oriented x 4  Respiratory:Clear lung sounds, on room air   Cardiology:  nsr   Gastrointestinal: soft, non tender, non distended   Genitourinary/Renal:    Musculoskeletal: moves all extremities   Skin: Intact skin   Lines: 18 rac    Labs Ordered and Resulted from Time of ED Arrival to Time of ED Departure   COMPREHENSIVE METABOLIC PANEL - Abnormal       Result Value    Sodium 139      Potassium 3.1 (*)     Carbon Dioxide (CO2) 30 (*)     Anion Gap 11      Urea Nitrogen 17.3      Creatinine 0.85      GFR Estimate >90      Calcium 9.8      Chloride 98      Glucose 93      Alkaline Phosphatase 122      AST 19      ALT 24      Protein Total 7.3      Albumin 4.3      Bilirubin Total 0.4     CBC WITH PLATELETS AND DIFFERENTIAL - Abnormal    WBC Count 14.5 (*)     RBC Count 4.59      Hemoglobin 13.9      Hematocrit 40.7      MCV 89      MCH 30.3      MCHC 34.2      RDW 12.5      Platelet Count 217      % Neutrophils        % Lymphocytes        % Monocytes        % Eosinophils        % Basophils        % Immature Granulocytes        NRBCs per 100 WBC 0      Absolute Neutrophils        Absolute Lymphocytes        Absolute Monocytes        Absolute Eosinophils        Absolute Basophils        Absolute Immature Granulocytes        " Absolute NRBCs 0.0     DIFFERENTIAL - Abnormal    % Neutrophils 51      % Lymphocytes 27      % Monocytes 18      % Eosinophils 4      % Basophils 0      Absolute Neutrophils 7.4      Absolute Lymphocytes 3.9      Absolute Monocytes 2.6 (*)     Absolute Eosinophils 0.6      Absolute Basophils 0.0      RBC Morphology Confirmed RBC Indices      Platelet Assessment   (*)     Value: Automated Count Confirmed. Giant platelets are present.   ETHYL ALCOHOL LEVEL - Normal    Alcohol ethyl <0.01     INR - Normal    INR 0.97          MR Brain w/o & w Contrast   Final Result   IMPRESSION:   HEAD MRI:    1.  No acute or subacute ischemic change.   2.  No acute cranial process or abnormal enhancement.   3.  Mild chronic small vessel ischemic change.      HEAD MRA:    1.  No aneurysm, high flow AVM or significant stenosis identified.   2.  Variant [place] anatomy as above.      NECK MRA:   1.  No hemodynamically significant stenosis within the vessels of the neck.      MRA Angiogram Head w/o Contrast   Final Result   IMPRESSION:   HEAD MRI:    1.  No acute or subacute ischemic change.   2.  No acute cranial process or abnormal enhancement.   3.  Mild chronic small vessel ischemic change.      HEAD MRA:    1.  No aneurysm, high flow AVM or significant stenosis identified.   2.  Variant [place] anatomy as above.      NECK MRA:   1.  No hemodynamically significant stenosis within the vessels of the neck.      MRA Angiogram Neck w/o & w Contrast   Final Result   IMPRESSION:   HEAD MRI:    1.  No acute or subacute ischemic change.   2.  No acute cranial process or abnormal enhancement.   3.  Mild chronic small vessel ischemic change.      HEAD MRA:    1.  No aneurysm, high flow AVM or significant stenosis identified.   2.  Variant [place] of [place] anatomy as above.      NECK MRA:   1.  No hemodynamically significant stenosis within the vessels of the neck.            Treatments and/or interventions provided:     "  Medications   gadobutrol (GADAVIST) injection 10 mL (10 mLs Intravenous $Given 5/10/24 2108)   sodium chloride (PF) 0.9% PF flush 100 mL (100 mLs Intravenous $Given 5/10/24 2108)   clopidogrel (PLAVIX) tablet 300 mg (300 mg Oral $Given 5/10/24 2222)        Patient's response to treatments and/or interventions:   Resting comfortably    To be done/followed up on inpatient unit:    See any in-patient orders    Does this patient have any cognitive concerns?:  na    Activity level - Baseline/Home:    Independent    Activity Level - Current:     Independent    Patient's Preferred language: Yi     Needed?: No    Isolation: None  Infection: Not Applicable  Patient tested for COVID 19 prior to admission: NO    Bariatric?: No    Vital Signs:   Vitals:    05/10/24 1930 05/10/24 2130 05/10/24 2200 05/10/24 2230   BP:  (!) 151/89 (!) 157/88 (!) 158/91   Pulse:  83 77 81   Resp:       TempSrc:       SpO2:  97% 96% 95%   Weight: 72.6 kg (160 lb)      Height: 1.65 m (5' 4.96\")          Cardiac Rhythm:     Was the PSS-3 completed:   Yes    Family Comments: na    For the majority of the shift this patient's behavior was Green.   Behavioral interventions performed were na    ED NURSE PHONE NUMBER: [phone]          "

## 2024-05-11 NOTE — PLAN OF CARE
"SLP: Orders received per CVA protocol, chart reviewed. Pt presented with double vision. HEAD MRI: \"1.  No acute or subacute ischemic change. 2.  No acute cranial process or abnormal enhancement. 3.  Mild chronic small vessel ischemic change.\" Pt passed RN dysphagia screen, on a regular diet. Approached pt in room, introduced role of SLP re: communication and swallowing with phone  (ID [id number]). Pt declined any issues/concerns, evaluation(s) not indicated at this time. Will complete orders. Re-consult if any issues/concerns arise.     "

## 2024-05-11 NOTE — PROGRESS NOTES
"   05/11/24 1215   Appointment Info   Signing Clinician's Name / Credentials (PT) Michelle Car, JOANN       Present yes   Language Cam[place]onian via Japper; daughter also present and speaks English   Living Environment   People in Home spouse   Home Accessibility no concerns   Transportation Anticipated family or friend will provide   Self-Care   Usual Activity Tolerance good   Current Activity Tolerance good   Equipment Currently Used at Home none   Fall history within last six months no   Activity/Exercise/Self-Care Comment Patient states that he almost fell yesterday when the double vision was particularly bad. Has WW at home from previous TKA which he can use.   General Information   Onset of Illness/Injury or Date of Surgery 05/10/24   Referring Physician Waqar Liu Hemaj, MD   Patient/Family Therapy Goals Statement (PT) Wants to go home. Feels double vision has improved. Is open to receiving vision therapy.   Pertinent History of Current Problem (include personal factors and/or comorbidities that impact the POC) Javier Cohen is a 68 year old male with history of diabetes mellitus presenting with a female  for double vision. Female  served as  for the following history. She reports that two day ago, the patient started experiencing double vision when both eyes open as well as balance issues. When he covers his left eye, he is able to see normally. When he covers his right eye, his vision is not double but is blurry. Patient has also had frontal \"head heaviness\" for which he took ibuprofen for, but no headache. No numbness, tingling, or weakness in his upper or lower extremities. He is not in any pain. No current drug or alcohol use. Patient is not anticoagulated. No history of eye surgeries, but he wears glasses at baseline . Per physician who was leaving room; no stroke. Caused by diabetes issues with retinal nerve   General Observations Sitting " on EOB. Daughter present in room. No balance deficits in sitting. Agreeable to participate.  through [employer] being used.   Cognition   Affect/Mental Status (Cognition) WNL   Orientation Status (Cognition) oriented x 4   Follows Commands (Cognition) WNL   Pain Assessment   Patient Currently in Pain No   Integumentary/Edema   Integumentary/Edema no deficits were identifed   Posture    Posture Not impaired   Range of Motion (ROM)   Range of Motion ROM is WFL   ROM Comment L knee still has mild deficits after TKA per pt.   Strength (Manual Muscle Testing)   Strength (Manual Muscle Testing) strength is WFL   Bed Mobility   Comment, (Bed Mobility) Reports and appears would be I   Transfers   Comment, (Transfers) STS I'lly   Gait/Stairs (Locomotion)   Comment, (Gait/Stairs) Ambulated 20' for eval with no AD and SBA   Balance   Balance Comments Feels mildly off balance when turns his head to L; no significant deficits noted   Sensory Examination   Sensory Perception patient reports no sensory changes   Coordination   Coordination no deficits were identified   Muscle Tone   Muscle Tone no deficits were identified   Clinical Impression   Criteria for Skilled Therapeutic Intervention Yes, treatment indicated   PT Diagnosis (PT) mild balance deficits with L diplopia   Influenced by the following impairments diplopia   Functional limitations due to impairments functionally I; needs to guard against falls   Clinical Presentation (PT Evaluation Complexity) evolving   Clinical Presentation Rationale clinical judgement   Clinical Decision Making (Complexity) low complexity   Planned Therapy Interventions (PT) balance training;home program guidelines   Risk & Benefits of therapy have been explained evaluation/treatment results reviewed;care plan/treatment goals reviewed;risks/benefits reviewed;current/potential barriers reviewed;participants voiced agreement with care plan;participants included;patient   PT Total  Evaluation Time   PT Eval, Low Complexity Minutes (67484) 15   Therapy Certification   Start of care date 05/11/24   Certification date from 05/11/24   Certification date to 06/10/24   Medical Diagnosis diplopia   Physical Therapy Goals   PT Frequency One time eval and treatment only   PT Predicted Duration/Target Date for Goal Attainment 05/11/24   PT: Transfers Independent;Sit to/from stand;Goal Met   PT: Gait Independent;Greater than 200 feet;Goal Met   Therapeutic Activity   Therapeutic Activities: dynamic activities to improve functional performance Minutes (30530) 10   Symptoms Noted During/After Treatment   (mild eye diplopia when fixates one one spot)   Treatment Detail/Skilled Intervention STS I'lly. Initially provided SBA as it appeard patient was hiking L L/E more at hip for foot clearance. Per patient , this is d/t L TKA still causing stiffness. Worked on head turns during gait with slight unsteadiness when turning head to L. Educated on compensation techniques for this. Recommend patient to be seen for OP vision therapy, likely by OT. Patient in agreement. Also instructed to use his WW and call physician if symptoms worsen. Pt. in agreement.   PT Discharge Planning   PT Plan D/C PT   PT Discharge Recommendation (DC Rec) home with outpatient physical therapy  (vision specific therapy)   PT Rationale for DC Rec Patient functioning I'lly with gait and transfers by end of session but does still have mild diplopia with mild instability when turns his head L; would benefit from OP vision therapy   PT Brief overview of current status I with functional mobility; needs higher level vision therapy   PT Equipment Needed at Discharge   (has WW if needed; does not need now)   Total Session Time   Timed Code Treatment Minutes 10   Total Session Time (sum of timed and untimed services) 25   [facility]  OUTPATIENT PHYSICAL THERAPY EVALUATION  PLAN OF TREATMENT FOR OUTPATIENT  REHABILITATION  (COMPLETE FOR INITIAL CLAIMS ONLY)  Patient's Last Name, First Name, M.I.  YOB: 1955  Javier Johnson                        Provider's Name  [facility] Medical Record No.  [id number]                             Onset Date:  05/10/24   Start of Care Date:  05/11/24   Type:     _X_PT   ___OT   ___SLP Medical Diagnosis:  diplopia              PT Diagnosis:  mild balance deficits with L diplopia Visits from SOC:  1     See note for plan of treatment, functional goals and certification details    I CERTIFY THE NEED FOR THESE SERVICES FURNISHED UNDER        THIS PLAN OF TREATMENT AND WHILE UNDER MY CARE     (Physician co-signature of this document indicates review and certification of the therapy plan).

## 2024-05-11 NOTE — CONSULTS
[facility]    Stroke Telephone Note    I was called by Chapincito Perez on 05/10/24 regarding patient Javier Cohen. The patient is a 68 year old male with hx of DM, HLD, alcohol abuse, HTN, smoker coming with binocular double vision that started 2 days back.    Vitals  BP: (!) 151/89   Pulse: 83   Resp: 16       Weight: 72.6 kg (160 lb)    IMPRESSION:  HEAD MRI:   1.  No acute or subacute ischemic change.  2.  No acute cranial process or abnormal enhancement.  3.  Mild chronic small vessel ischemic change.     HEAD MRA:   1.  No aneurysm, high flow AVM or significant stenosis identified.  2.  Variant [place] of [place] anatomy as above.     NECK MRA:  1.  No hemodynamically significant stenosis within the vessels of the neck.    Impression  Binocular double vision.    Recommendations  - goal SBP < 180 mmHg  - Daily aspirin 81 mg for secondary stroke prevention  - Plavix (clopidogrel) 300 mg PO loading dose x 1  - Plavix (clopidogrel) 75 mg PO Daily  - Statin: atorvastatin 40 mg  - TTE (with Bubble Study if age 60 yrs or less)  - Telemetry, EKG  - Bedside Glucose Monitoring  - A1c, Lipid Panel, Troponin x 3  - PT/OT/SLP  - Stroke Education  - Euthermia, Euglycemia  - smoking cessation  - stroke clinic follow up.    Secondary stroke prevention  Antiplatelets  LDL goal less than 70  HbA1c goal less than 6  Systolic blood pressure less than 140/90  reduce red meat in diet  Regular exercise  Smoking cessation  Antiplatelet side effects counseling  Statin side effects common and uncommon counseling  Anticoagulation, uncommon and monitoring counseling  Pharmacological and nonpharmacological nicotine dependency counseling  Return precautions: patient is to return immediately to the nearest ED for any stroke-like symptoms including weakness, change in vision, change in speech, numbness or tingling, difficulty walking or headache.     My recommendations are based on the information provided  over the phone by Javier Cohen's in-person providers. They are not intended to replace the clinical judgment of his in-person providers. I was not requested to personally see or examine the patient at this time.     Tarik Cordoba MD

## 2024-05-11 NOTE — PROGRESS NOTES
RECEIVING UNIT ED HANDOFF REVIEW    ED Nurse Handoff Report was reviewed by: Darshana Cr RN on May 11, 2024 at 12:56 AM

## 2024-05-11 NOTE — DISCHARGE SUMMARY
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   HTN  HLD  Hx of Alcohol use disorder, sober x 1 year     Clinically Significant Risk Factors     # DMII: A1C = 11.0 % (Ref range: <5.7 %) within past 6 months  # Overweight: Estimated body mass index is 27.16 kg/m  as calculated from the following:    Height as of this encounter: 1.651 m (5' 5\").    Weight as of this encounter: 74 kg (163 lb 3.2 oz).       Follow-ups Needed After Discharge   Follow-up Appointments     Follow-up and recommended labs and tests       Follow up with primary care provider, Viry Cline, within 7 days for   hospital follow- up.  The following labs/tests are recommended: potassium.   Bring your blood sugar levels to your appointment to review.   Call your clinic on Monday and explain your recent admission to the   hospital for Cranial Nerve 6 palsy secondary to diabetes and that you need   an appointment with the opthomologist and primary care provider.  Your white blood cell count is chronically elevated and you have some   abnormal cells noted. Therefore, you should talk to your primary care   doctor about a hematologist consult to further evaluate.            Unresulted Labs Ordered in the Past 30 Days of this Admission       No orders found for last 31 day(s).            Discharge Disposition   Discharged to home  Condition at discharge: Good    Hospital Course   Javier Cohen is a 68 year old male with a past medical history of type 2 diabetes, hypertension, dyslipidemia presents to hospital with binocular diplopia.     Dipopia " secondary to CN6 palsy  Unsteady gait  Patient presented with diplopia and unsteady gait for three days. Symptoms are persistent.   * MRI head/ MRA head and neck negative for stroke, aneurysm, high flow AVM, or significant stenosis.   * Case was discussed with stroke neurology who recommended completing the stroke work up and treating as a stroke for which the patient is being admitted.  He was loaded with Plavix in the emergency room.  Evaluated by neurology on 05/11/24. Exam consistent with CNVI palsy. No stroke.   Evaluated by PT and recommended home with assist with outpatient PT vision program, ordered.   Follow up for improved diabetic control and opthalmology evaluation.   Notably he does not have a 's license and does not drive.     Hypokalemia, RESOLVING  Check magnesium, nl at 1.7.   Replaced potassium per protocol. Reviewed recent potassium levels in the clinic > normal ~ 4. Therefore, did not order potassium at discharge.      Type 2 DM, A1c 11 on 5/10  The patient is on glargine-lixisendatide a combination of insulin and a glp1 agonist , and metformin.  He states BS have been well controlled 100-140s at home. Gets occasional blood sugars under 100, but almost never under 70. He can not recall this happening recently.  BS here have not been elevated despite not being on his home medications, just sliding scale.   Resume PTA regimen and follow up with PCP with blood sugar levels.   Discussed goal A1c < 7, Fasting AM blood sugar: . Pre-meal BS < 150.    Recent Labs   Lab 05/11/24  0744 05/11/24  0124 05/10/24  1919   * 148* 93     Chronic leukocytosis, concerning for myeloproliferative disorder. without prior hematologic evaluation.   * Note WBC have been elevated 12-14 for a few years with elevated monocytes on differential and giant platelets noted at this admission. This indicate a possible myeloproliferative disorder.   Recommended outpatient hematology evaluation.     Chronic stable  medical problems.   HTN  HLD  Hx of Alcohol use disorder  Has been sober for 1 year  Possible BPH - not currently on flomax as documented in H&P.     Consultations This Hospital Stay   SPEECH LANGUAGE PATH ADULT IP CONSULT  SMOKING CESSATION PROGRAM IP CONSULT  NEUROLOGY IP STROKE CONSULT  PHYSICAL THERAPY ADULT IP CONSULT  OCCUPATIONAL THERAPY ADULT IP CONSULT  SPEECH LANGUAGE PATH ADULT IP CONSULT    Code Status   Full Code    Time Spent on this Encounter   I, Jyoti Posey MD, personally saw the patient today and spent greater than 30 minutes discharging this patient.       Jyoti Posey MD  [facility] EXTENDED RECOVERY AND SHORT STAY  [address] [place] [id number]  Phone: [phone]  ______________________________________________________________________    Physical Exam   Vital Signs: Temp: 97.7  F (36.5  C) Temp src: Oral BP: (!) 156/99 Pulse: 87   Resp: 16 SpO2: 98 % O2 Device: None (Room air)    Weight: 163 lbs 3.2 oz  Constitutional:  NAD,   Neuropsyche:  alert and oriented, answers questions appropriately.   Speech normal.  R eye fragoso not fully abduct with lateral horizontal gaze, leading to double vision.   Face symmetric.   Moving all extremities without focal deficit.  Respiratory:      Breathing comfortably, good air exchange, no wheezes, no crackles.   Cardiovascular:  Regular rate and rhythm  GI:  soft, NT/ND, BS normal  Skin/Integumen:  No acute rash or sign of bleeding.        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   You should follow up with ophthalmology and your primary care doctor to get your diabetes under better control.   Your stroke work up was negative.  Your potassium was mildly low, but I see recent levels in the clinic were normal. We replaced your  potassium during this stay and you should have a follow up level with your provider.     Activity    Your activity upon discharge: activity as tolerated with outpatient PT.     Patient care order    Your double vision is due to poorly controlled diabetes.   Your A1c was 11. A1c level measures average blood sugars over the last 3 months. Goal A1c is < 7.   Goal fasting AM blood sugar is under 130  Goal premeal blood sugar is under 150.   Decrease your insulin by 30% and call your doctor if you blood sugars are falling below 70.     Please follow your blood sugars 3x/day prior to follow up with your doctor and bring these levels to your appointment.     Follow-up and recommended labs and tests     Follow up with primary care provider, Viry Cline, within 7 days for hospital follow- up.  The following labs/tests are recommended: potassium. Bring your blood sugar levels to your appointment to review.   Call your clinic on Monday and explain your recent admission to the hospital for Cranial Nerve 6 palsy secondary to diabetes and that you need an appointment with the opthomologist and primary care provider.  Your white blood cell count is chronically elevated and you have some abnormal cells noted. Therefore, you should talk to your primary care doctor about a hematologist consult to further evaluate.     Diet    Low carbohydrate diet, mediterranean diet.       Significant Results and Procedures   Most Recent 3 CBC's:  Recent Labs   Lab Test 05/10/24  1919 10/25/19  1713 10/09/19  2112   WBC 14.5* 13.2* 14.7*   HGB 13.9 15.0 16.2   MCV 89 92 92    179 188     Most Recent 3 BMP's:  Recent Labs   Lab Test 05/11/24  1135 05/11/24  1130 05/11/24  0744 05/11/24  0124 05/10/24  1919 10/09/19  2112 07/18/18  1645 07/18/18  0921   NA  --   --   --   --  139 137  --  138   POTASSIUM 3.3*  --   --   --  3.1* 3.1*   < > 3.3*   CHLORIDE  --   --   --   --  98 102  --  102   CO2  --   --   --   --  30* 31  --  27   BUN  --   --    --   --  17.3 10  --  7   CR  --   --   --   --  0.85 0.65*  --  0.58*   ANIONGAP  --   --   --   --  11 4  --  9   ULISES  --   --   --   --  9.8 8.6  --  8.4*   GLC  --  167* 139* 148* 93 101*  --  145*    < > = values in this interval not displayed.     Most Recent 2 LFT's:  Recent Labs   Lab Test 05/10/24  1919 10/09/19  2112   AST 19 23   ALT 24 57   ALKPHOS 122 193*   BILITOTAL 0.4 0.4   ,   Results for orders placed or performed during the hospital encounter of 05/10/24   MR Brain w/o & w Contrast    Narrative    EXAM: MRA BRAIN ([place]) W/O CONTRAST, MR BRAIN W/O and W CONTRAST, MRA NECK (CAROTIDS) W/O and W CONTRAST  LOCATION: [facility]  DATE: 5/10/2024    INDICATION: double vision x 2days  COMPARISON: None.  CONTRAST: 10 mL Gadavist  TECHNIQUE:   1) Routine multiplanar multisequence head MRI without and with intravenous contrast.  2) 3D time-of-flight head MRA without intravenous contrast.  3) Neck MRA without and with IV contrast. Stenosis measurements made according to NASCET criteria unless otherwise specified.    FINDINGS:  HEAD MRI:  INTRACRANIAL CONTENTS: No acute or subacute infarct. No mass, acute hemorrhage, or extra-axial fluid collections. Scattered nonspecific T2/FLAIR hyperintensities within the cerebral white matter most consistent with mild chronic microvascular ischemic   change. Additional T2 signal abnormality is present in the ventral right medulla. Normal ventricles and sulci. Normal position of the cerebellar tonsils. No pathologic contrast enhancement.    SELLA: No abnormality accounting for technique.    OSSEOUS STRUCTURES/SOFT TISSUES: Normal marrow signal. The major intracranial vascular flow voids are maintained.     ORBITS: Prior bilateral cataract surgery. Visualized portions of the orbits are otherwise unremarkable.     SINUSES/MASTOIDS: Mild mucosal thickening scattered about the paranasal sinuses. No middle ear or mastoid effusion.     HEAD  MRA:   ANTERIOR CIRCULATION: No stenosis/occlusion, aneurysm, or high flow vascular malformation. Fetal origin of the right posterior cerebral artery from the anterior circulation.    POSTERIOR CIRCULATION: No stenosis/occlusion, aneurysm, or high flow vascular malformation. Dominant left vertebral artery supplies the basilar artery with a small right vertebral artery supplying the right posterior inferior cerebellar artery (PICA).     NECK MRA:   RIGHT CAROTID: No measurable stenosis or dissection.    LEFT CAROTID: No measurable stenosis or dissection.    VERTEBRAL ARTERIES: No focal stenosis or dissection. Dominant left and smaller right vertebral arteries.    AORTIC ARCH: Classic aortic arch anatomy with no significant stenosis at the origin of the great vessels.      Impression    IMPRESSION:  HEAD MRI:   1.  No acute or subacute ischemic change.  2.  No acute cranial process or abnormal enhancement.  3.  Mild chronic small vessel ischemic change.    HEAD MRA:   1.  No aneurysm, high flow AVM or significant stenosis identified.  2.  Variant [place] of [place] anatomy as above.    NECK MRA:  1.  No hemodynamically significant stenosis within the vessels of the neck.   MRA Angiogram Head w/o Contrast    Narrative    EXAM: MRA BRAIN ([place] OF [place]) W/O CONTRAST, MR BRAIN W/O and W CONTRAST, MRA NECK (CAROTIDS) W/O and W CONTRAST  LOCATION: [facility]  DATE: 5/10/2024    INDICATION: double vision x 2days  COMPARISON: None.  CONTRAST: 10 mL Gadavist  TECHNIQUE:   1) Routine multiplanar multisequence head MRI without and with intravenous contrast.  2) 3D time-of-flight head MRA without intravenous contrast.  3) Neck MRA without and with IV contrast. Stenosis measurements made according to NASCET criteria unless otherwise specified.    FINDINGS:  HEAD MRI:  INTRACRANIAL CONTENTS: No acute or subacute infarct. No mass, acute hemorrhage, or extra-axial fluid collections. Scattered nonspecific  T2/FLAIR hyperintensities within the cerebral white matter most consistent with mild chronic microvascular ischemic   change. Additional T2 signal abnormality is present in the ventral right medulla. Normal ventricles and sulci. Normal position of the cerebellar tonsils. No pathologic contrast enhancement.    SELLA: No abnormality accounting for technique.    OSSEOUS STRUCTURES/SOFT TISSUES: Normal marrow signal. The major intracranial vascular flow voids are maintained.     ORBITS: Prior bilateral cataract surgery. Visualized portions of the orbits are otherwise unremarkable.     SINUSES/MASTOIDS: Mild mucosal thickening scattered about the paranasal sinuses. No middle ear or mastoid effusion.     HEAD MRA:   ANTERIOR CIRCULATION: No stenosis/occlusion, aneurysm, or high flow vascular malformation. Fetal origin of the right posterior cerebral artery from the anterior circulation.    POSTERIOR CIRCULATION: No stenosis/occlusion, aneurysm, or high flow vascular malformation. Dominant left vertebral artery supplies the basilar artery with a small right vertebral artery supplying the right posterior inferior cerebellar artery (PICA).     NECK MRA:   RIGHT CAROTID: No measurable stenosis or dissection.    LEFT CAROTID: No measurable stenosis or dissection.    VERTEBRAL ARTERIES: No focal stenosis or dissection. Dominant left and smaller right vertebral arteries.    AORTIC ARCH: Classic aortic arch anatomy with no significant stenosis at the origin of the great vessels.      Impression    IMPRESSION:  HEAD MRI:   1.  No acute or subacute ischemic change.  2.  No acute cranial process or abnormal enhancement.  3.  Mild chronic small vessel ischemic change.    HEAD MRA:   1.  No aneurysm, high flow AVM or significant stenosis identified.  2.  Variant [place] of [place] anatomy as above.    NECK MRA:  1.  No hemodynamically significant stenosis within the vessels of the neck.   MRA Angiogram Neck w/o & w Contrast    Narrative     EXAM: MRA BRAIN ([place] OF [place]) W/O CONTRAST, MR BRAIN W/O and W CONTRAST, MRA NECK (CAROTIDS) W/O and W CONTRAST  LOCATION: [facility]  DATE: 5/10/2024    INDICATION: double vision x 2days  COMPARISON: None.  CONTRAST: 10 mL Gadavist  TECHNIQUE:   1) Routine multiplanar multisequence head MRI without and with intravenous contrast.  2) 3D time-of-flight head MRA without intravenous contrast.  3) Neck MRA without and with IV contrast. Stenosis measurements made according to NASCET criteria unless otherwise specified.    FINDINGS:  HEAD MRI:  INTRACRANIAL CONTENTS: No acute or subacute infarct. No mass, acute hemorrhage, or extra-axial fluid collections. Scattered nonspecific T2/FLAIR hyperintensities within the cerebral white matter most consistent with mild chronic microvascular ischemic   change. Additional T2 signal abnormality is present in the ventral right medulla. Normal ventricles and sulci. Normal position of the cerebellar tonsils. No pathologic contrast enhancement.    SELLA: No abnormality accounting for technique.    OSSEOUS STRUCTURES/SOFT TISSUES: Normal marrow signal. The major intracranial vascular flow voids are maintained.     ORBITS: Prior bilateral cataract surgery. Visualized portions of the orbits are otherwise unremarkable.     SINUSES/MASTOIDS: Mild mucosal thickening scattered about the paranasal sinuses. No middle ear or mastoid effusion.     HEAD MRA:   ANTERIOR CIRCULATION: No stenosis/occlusion, aneurysm, or high flow vascular malformation. Fetal origin of the right posterior cerebral artery from the anterior circulation.    POSTERIOR CIRCULATION: No stenosis/occlusion, aneurysm, or high flow vascular malformation. Dominant left vertebral artery supplies the basilar artery with a small right vertebral artery supplying the right posterior inferior cerebellar artery (PICA).     NECK MRA:   RIGHT CAROTID: No measurable stenosis or dissection.    LEFT CAROTID:  No measurable stenosis or dissection.    VERTEBRAL ARTERIES: No focal stenosis or dissection. Dominant left and smaller right vertebral arteries.    AORTIC ARCH: Classic aortic arch anatomy with no significant stenosis at the origin of the great vessels.      Impression    IMPRESSION:  HEAD MRI:   1.  No acute or subacute ischemic change.  2.  No acute cranial process or abnormal enhancement.  3.  Mild chronic small vessel ischemic change.    HEAD MRA:   1.  No aneurysm, high flow AVM or significant stenosis identified.  2.  Variant [place] of [place] anatomy as above.    NECK MRA:  1.  No hemodynamically significant stenosis within the vessels of the neck.       Discharge Medications   Current Discharge Medication List        CONTINUE these medications which have NOT CHANGED    Details   amLODIPine (NORVASC) 10 MG tablet Take 10 mg by mouth daily      insulin glargine-lixisenatide (SOLIQUA 100/33) pen Inject 20 Units Subcutaneous daily      losartan (COZAAR) 50 MG tablet Take 50 mg by mouth daily      metFORMIN (GLUCOPHAGE XR) 500 MG 24 hr tablet Take 1,000 mg by mouth daily      rosuvastatin (CRESTOR) 20 MG tablet Take 20 mg by mouth daily      VITAMIN D PO Take 1 tablet by mouth daily      albuterol (PROAIR HFA/PROVENTIL HFA/VENTOLIN HFA) 108 (90 Base) MCG/ACT inhaler Inhale 2 puffs into the lungs every 4 hours Use every 4 hours for the next 2 days, then if improving decrease to every 6 hours as needed only.  Qty: 1 Inhaler, Refills: 0    Comments: Pharmacy may dispense brand covered by insurance (Proair, or proventil or ventolin or generic albuterol inhaler)  Associated Diagnoses: COPD exacerbation (H)      metoprolol succinate (TOPROL-XL) 50 MG 24 hr tablet Take 1 tablet (50 mg) by mouth daily  Qty: 30 tablet, Refills: 3    Associated Diagnoses: Alcohol abuse           STOP taking these medications       tamsulosin (FLOMAX) 0.4 MG capsule Comments:   Reason for Stopping:             Allergies   Allergies  "  Allergen Reactions    Cashew Nut Oil Hives     Cashew nut oil ~\": burning inside\"  Burning inside      Corylus Difficulty breathing     nuts    Atorvastatin Unknown and GI Disturbance    Tree Nuts [Nuts]     Lisinopril Rash     "

## 2024-05-11 NOTE — PLAN OF CARE
A/OX4,VSS. Denies pain. Up independent in room. Double vision resolved. Daughter at bedside. All discharge paper explained and given to the pt. Educate pt about diabetic diet.

## 2024-05-11 NOTE — ED PROVIDER NOTES
"  Emergency Department Note      History of Present Illness     Chief Complaint  Vision changes     HPI  Javier Cohen is a 68 year old male with history of diabetes mellitus presenting with a female  for double vision. Female  served as  for the following history. She reports that two day ago, the patient started experiencing double vision when both eyes open as well as balance issues. When he covers his left eye, he is able to see normally. When he covers his right eye, his vision is not double but is blurry. Patient has also had frontal \"head heaviness\" for which he took ibuprofen for, but no headache. No numbness, tingling, or weakness in his upper or lower extremities. He is not in any pain. No current drug or alcohol use. Patient is not anticoagulated. No history of eye surgeries, but he wears glasses at baseline.     Independent Historian  Female  as detailed above.    Review of External Notes  None  Past Medical History   Medical History and Problem List  Type 2 diabetes mellitus   PVCs   Hyperlipidemia   Hypertension  Colon polyps   Diverticula of colon  Optic nerve drusen  Aquired punctal stenosis   Nuclear cataract   MGD   MCCARTHY     Medications  Hydrochlorothiazide  Lisinopril  Metoprolol succinate  Rosuvastatin  Tamsulosin   Metformin   Amlodipine   Arthritis of left knee     Surgical History   Right TKA revision   Excision pterygium with amniotic membrane, right   Physical Exam   Patient Vitals for the past 24 hrs:   BP Temp src Pulse Resp SpO2 Height Weight   05/11/24 0030 (!) 166/98 -- 77 -- -- -- --   05/10/24 2330 (!) 169/103 -- 88 -- 99 % -- --   05/10/24 2230 (!) 158/91 -- 81 -- 95 % -- --   05/10/24 2200 (!) 157/88 -- 77 -- 96 % -- --   05/10/24 2130 (!) 151/89 -- 83 -- 97 % -- --   05/10/24 1930 -- -- -- -- -- 1.65 m (5' 4.96\") 72.6 kg (160 lb)   05/10/24 1912 (!) 167/94 Oral 88 16 99 % -- --     Physical Exam  GENERAL: Patient " well-appearing  HEAD: Atraumatic.  EYES: Anicteric. PERRL.  Dual fields full to confrontation.  No nystagmus.  NOSE: No active bleeding  MOUTH: Moist mucosa  THROAT: Patent airway.   NECK: No rigidity  CV: RRR, no murmurs rubs or gallops  PULM: CTAB with good aeration; no retractions, rales, rhonchi, or wheezing  ABD: Soft, nontender, nondistended, no guarding, no peritoneal signs   DERM: No rash. Skin warm and dry  EXTREMITY: Moving all extremities without difficulty. No calf tenderness or peripheral edema  VASCULAR: Symmetric pulses bilaterally  NEURO: A,Ox3. CN 2-12 fully intact. Strength 5/5 bilateral LE/UE. Sensation fully intact to light touch symmetrically bilateral LE/UE. Normal finger-to-nose and heel to shin. No ataxia.    Diagnostics   Lab Results   Labs Ordered and Resulted from Time of ED Arrival to Time of ED Departure   COMPREHENSIVE METABOLIC PANEL - Abnormal       Result Value    Sodium 139      Potassium 3.1 (*)     Carbon Dioxide (CO2) 30 (*)     Anion Gap 11      Urea Nitrogen 17.3      Creatinine 0.85      GFR Estimate >90      Calcium 9.8      Chloride 98      Glucose 93      Alkaline Phosphatase 122      AST 19      ALT 24      Protein Total 7.3      Albumin 4.3      Bilirubin Total 0.4     CBC WITH PLATELETS AND DIFFERENTIAL - Abnormal    WBC Count 14.5 (*)     RBC Count 4.59      Hemoglobin 13.9      Hematocrit 40.7      MCV 89      MCH 30.3      MCHC 34.2      RDW 12.5      Platelet Count 217      % Neutrophils        % Lymphocytes        % Monocytes        % Eosinophils        % Basophils        % Immature Granulocytes        NRBCs per 100 WBC 0      Absolute Neutrophils        Absolute Lymphocytes        Absolute Monocytes        Absolute Eosinophils        Absolute Basophils        Absolute Immature Granulocytes        Absolute NRBCs 0.0     DIFFERENTIAL - Abnormal    % Neutrophils 51      % Lymphocytes 27      % Monocytes 18      % Eosinophils 4      % Basophils 0      Absolute  Neutrophils 7.4      Absolute Lymphocytes 3.9      Absolute Monocytes 2.6 (*)     Absolute Eosinophils 0.6      Absolute Basophils 0.0      RBC Morphology Confirmed RBC Indices      Platelet Assessment   (*)     Value: Automated Count Confirmed. Giant platelets are present.   ETHYL ALCOHOL LEVEL - Normal    Alcohol ethyl <0.01     INR - Normal    INR 0.97     TSH WITH FREE T4 REFLEX     Imaging  MR Brain w/o & w Contrast   Final Result   IMPRESSION:   HEAD MRI:    1.  No acute or subacute ischemic change.   2.  No acute cranial process or abnormal enhancement.   3.  Mild chronic small vessel ischemic change.      HEAD MRA:    1.  No aneurysm, high flow AVM or significant stenosis identified.   2.  Variant [place] anatomy as above.      NECK MRA:   1.  No hemodynamically significant stenosis within the vessels of the neck.      MRA Angiogram Head w/o Contrast   Final Result   IMPRESSION:   HEAD MRI:    1.  No acute or subacute ischemic change.   2.  No acute cranial process or abnormal enhancement.   3.  Mild chronic small vessel ischemic change.      HEAD MRA:    1.  No aneurysm, high flow AVM or significant stenosis identified.   2.  Variant [place] anatomy as above.      NECK MRA:   1.  No hemodynamically significant stenosis within the vessels of the neck.      MRA Angiogram Neck w/o & w Contrast   Final Result   IMPRESSION:   HEAD MRI:    1.  No acute or subacute ischemic change.   2.  No acute cranial process or abnormal enhancement.   3.  Mild chronic small vessel ischemic change.      HEAD MRA:    1.  No aneurysm, high flow AVM or significant stenosis identified.   2.  Variant [place] anatomy as above.      NECK MRA:   1.  No hemodynamically significant stenosis within the vessels of the neck.      Echocardiogram Complete    (Results Pending)     EKG   ECG results from 05/10/24   EKG 12-lead, tracing only     Value    Systolic Blood Pressure     Diastolic Blood Pressure      Ventricular Rate 84    Atrial Rate 84    AK Interval 160    QRS Duration 100        QTc 475    P Axis 54    R AXIS 12    T Axis 24    Interpretation ECG      Sinus rhythm  Nonspecific ST abnormality  Abnormal ECG  When compared with ECG of 16-JUL-2018 22:24,  No significant change was found  Confirmed by GENERATED REPORT, COMPUTER ([id number]),  Anastasia Tate ([id number]) on 5/10/2024 7:48:10 PM        Independent Interpretation  None  ED Course    Medications Administered  Medications   rosuvastatin (CRESTOR) tablet 5 mg (has no administration in time range)   tamsulosin (FLOMAX) capsule 0.4 mg (has no administration in time range)   glucose gel 15-30 g (has no administration in time range)     Or   dextrose 50 % injection 25-50 mL (has no administration in time range)     Or   glucagon injection 1 mg (has no administration in time range)   aspirin EC tablet 81 mg (has no administration in time range)     Or   aspirin (ASA) chewable tablet 81 mg (has no administration in time range)   clopidogrel (PLAVIX) tablet 75 mg (has no administration in time range)   acetaminophen (TYLENOL) tablet 650 mg (has no administration in time range)     Or   acetaminophen (TYLENOL) oral liquid 650 mg (has no administration in time range)     Or   acetaminophen (TYLENOL) Suppository 650 mg (has no administration in time range)   ondansetron (ZOFRAN ODT) ODT tab 4 mg (has no administration in time range)     Or   ondansetron (ZOFRAN) injection 4 mg (has no administration in time range)   insulin aspart (NovoLOG) injection (RAPID ACTING) (has no administration in time range)   insulin aspart (NovoLOG) injection (RAPID ACTING) (has no administration in time range)   gadobutrol (GADAVIST) injection 10 mL (10 mLs Intravenous $Given 5/10/24 2108)   sodium chloride (PF) 0.9% PF flush 100 mL (100 mLs Intravenous $Given 5/10/24 2108)   clopidogrel (PLAVIX) tablet 300 mg (300 mg Oral $Given 5/10/24 2222)     Discussion of  Management  Discussed with stroke neurology Dr. Cordoba.  Discussed with hospitalist Dr. Cash    Social Determinants of Health adding to complexity of care  Patient does not speak English. Female  at bedside gave history.    ED Course  ED Course as of 05/11/24 0116   Fri May 10, 2024   1911 I obtained history and examined the patient as noted above.      Medical Decision Making / Diagnosis        TITA Cohen is a 68 year old male, history of diabetes, hypertension, hyperlipidemia, prior alcohol abuse, presenting with diplopia.  Differential diagnosis-considered CVA, orbital muscle entrapment, eyeball alignment issue, metabolic abnormality, and others.  Patient is well-appearing.  I do not see obvious issues with eyeball itself.  He is in no pain.  Basic labs showing hypokalemia and leukocytosis but no fever or infectious symptoms.  Neurologic exam is unremarkable other than subjective diplopia which resolves when he covers up either eye.  Visual acuities 20/50 left eye, 20/40 right eye, uncorrected, patient typically wears glasses but he states his glasses make the symptoms worse.  MRI brain and MRA head and neck negative for acute process.  Patien reportedly on aspirin.  Started Plavix load.  Discussed with stroke neurology who recommends inpatient versus outpatient workup.  Discussed with patient and they are content with inpatient workup.  Discussed with hospitalist and patient admitted.  Disposition  The patient was admitted to the hospital under the care of Dr. Cash.     ICD-10 Codes:    ICD-10-CM    1. Diplopia  H53.2       2. Hypokalemia  E87.6          Discharge Medications  Current Discharge Medication List        Scribe Disclosure:  I, Karlie Turner, am serving as a scribe at 7:11 PM on 5/10/2024 to document services personally performed by Chapincito Perez MD based on my observations and the provider's statements to me.     [employer]       Chapincito Perez MD  05/11/24 0117

## 2024-05-11 NOTE — PLAN OF CARE
OT: Order received, chart reviewed and discussed with care team. Per PT, patient is independent within the room and notes that most of symptoms have resolved. Patient with no OT concerns within the acute care setting. Defer discharge recommendations to care team. Will complete OT orders.

## 2024-05-11 NOTE — PLAN OF CARE
Goal Outcome Evaluation:    PRIMARY Concern: double vision since 5/7  SAFETY RISK Concerns (fall risk, behaviors, etc.): fall risk  Isolation/Type: none  Tests/Procedures for NEXT shift: echo, AM labs  Consults? (Pending/following, signed-off?) SLP, PT/OT, stroke neuro to consult  Where is patient from? (Home, TCU, etc.): home  Other Important info for NEXT shift: [place] speaking, needs interpretor. Neuros intact ex double vision (improving per pt)  Anticipated DC date & active delays: TBD  _____________________________________________________________________________  SUMMARY NOTE:  Orientation/Cognitive: A&Ox4  Observation Goals (Met/ Not Met): not met  Mobility Level/Assist Equipment: SBA  Antibiotics & Plan (IV/po, length of tx left): none  Pain Management: denies  Tele/VS/O2: VSS on RA ex HTN. Tele SR  ABNL Lab/BG: K 3.1, replaced, recheck at 0800, WBC 14.5  Diet: Regular, carb count  Bowel/Bladder: continent  Skin Concerns: none  Drains/Devices: PIV SL  Patient Stated Goal for Today: sleep    Observation goals  PRIOR TO DISCHARGE       Comments: List all goals to be met before discharge home      Free from ACUTE neuro deficits: not met  Testing complete: not met  Other:  Nurse to notify provider when observation goals have been met and patient is ready for discharge.

## 2024-05-11 NOTE — PROGRESS NOTES
[facility]    Hospitalist Progress Note    Date of Service (when I saw the patient): 05/11/2024  Admit date: 5/10/2024    Interval History   Full details of events over last 24 hours outlined below.   Afebrile hemodynamically stable with systolic blood pressures in the 150s.  O2 90% on room air  Double vision is better. Actually notes left eye vision more blurry than right.   On exam he has a clear CN VI palsy.     Assessment & Plan   Javier Cohen is a 68 year old male with a past medical history of type 2 diabetes, hypertension, dyslipidemia presents to hospital with binocular diplopia.     Dipopia secondary to CN6 palsy  Unsteady gait  Patient presented with diplopia and unsteady gait for three days. Symptoms are persistent.   * MRI head/ MRA head and neck negative for stroke, aneurysm, high flow AVM, or significant stenosis.   * Case was discussed with stroke neurology who recommended completing the stroke work up and treating as a stroke for which the patient is being admitted.  He was loaded with Plavix in the emergency room.  Evaluated by neurology on 05/11/24. Exam consistent with CNVI palsy. No stroke.   Evaluated by PT and recommended home with assist with outpatient PT vision program, ordered.   Follow up for improved diabetic control and opthalmology evaluation.     Hypokalemia, RESOLVING  Check magnesium, nl at 1.7.   Replaced potassium per protocol. Reviewed recent potassium levels in the clinic > normal ~ 4. Therefore, did not order potassium at discharge.      Type 2 DM, A1c 11 on 5/10  The patient is on glargine-lixisendatide a combination of insulin and a glp1 agonist , and metformin.  He states BS have been well controlled 100-140s at home. Gets occasional blood sugars under 100, but almost never under 70. He can not recall this happening recently.  BS here have not been elevated despite not being on his home medications, just sliding scale.   Resume PTA  "regimen and follow up with PCP with blood sugar levels.   Discussed goal A1c < 7, Fasting AM blood sugar: . Pre-meal BS < 150.    Recent Labs   Lab 05/11/24  0744 05/11/24  0124 05/10/24  1919   * 148* 93     Chronic stable medical problems.   HTN  HLD  Hx of Alcohol abuse  Has been sober for 1 year    Clinically Significant Risk Factors Present on Admission        # Hypokalemia: Lowest K = 3.1 mmol/L in last 2 days, will replace as needed           # Hypertension: Home medication list includes antihypertensive(s)     # DMII: A1C = 11.0 % (Ref range: <5.7 %) within past 6 months    # Overweight: Estimated body mass index is 27.16 kg/m  as calculated from the following:    Height as of this encounter: 1.651 m (5' 5\").    Weight as of this encounter: 74 kg (163 lb 3.2 oz).                Diet: Orders Placed This Encounter      Regular Diet Adult     IVF: None  DVT Prophylaxis: None  Yousif Catheter: Not present     Full Code  Disposition:  Anticipate discharge today  PT/OT home with outpatient PT   Communication: Discussed with patient, family and RN on 05/11/24    Jyoti Posey MD    Hospitalist Service  [facility]  Securely message with the Vocera Web Console (learn more here)  Text page via [employer] Paging/Directory      -Data reviewed today: I reviewed all new labs and imaging results over the last 24 hours. I personally reviewed no images or EKG's today.    Physical Exam   Temp: 98.1  F (36.7  C) Temp src: Oral BP: (!) 153/82 Pulse: 78   Resp: 16 SpO2: 98 % O2 Device: None (Room air)    Vitals:    05/10/24 1930 05/11/24 0116   Weight: 72.6 kg (160 lb) 74 kg (163 lb 3.2 oz)     Vital Signs with Ranges  Temp:  [97.6  F (36.4  C)-98.1  F (36.7  C)] 98.1  F (36.7  C)  Pulse:  [77-88] 78  Resp:  [14-16] 16  BP: (151-169)/() 153/82  SpO2:  [95 %-99 %] 98 %  No intake/output data recorded.    Today's Exam  Constitutional:  NAD,   Neuropsyche:  alert and oriented, answers " questions appropriately.   Speech normal.  R eye fragoso not fully abduct with lateral horizontal gaze, leading to double vision.   Face symmetric.   Moving all extremities without focal deficit.  Respiratory:  Breathing comfortably, good air exchange, no wheezes, no crackles.   Cardiovascular:  Regular rate and rhythm  GI:  soft, NT/ND, BS normal  Skin/Integumen:  No acute rash or sign of bleeding.     Medications   All medications reviewed on 05/11/24    Current Facility-Administered Medications   Medication Dose Route Frequency Provider Last Rate Last Admin     Current Facility-Administered Medications   Medication Dose Route Frequency Provider Last Rate Last Admin    aspirin EC tablet 81 mg  81 mg Oral Daily Waqar Cash MD   81 mg at 05/11/24 0813    Or    aspirin (ASA) chewable tablet 81 mg  81 mg Oral or NG Tube Daily Waqar Cash MD        clopidogrel (PLAVIX) tablet 75 mg  75 mg Oral or NG Tube Daily Waqar Cash MD   75 mg at 05/11/24 0813    insulin aspart (NovoLOG) injection (RAPID ACTING)  1-7 Units Subcutaneous TID AC Waqar Cash MD        insulin aspart (NovoLOG) injection (RAPID ACTING)  1-5 Units Subcutaneous At Bedtime Waqar Cash MD        rosuvastatin (CRESTOR) tablet 5 mg  5 mg Oral Daily Waqar Cash MD        tamsulosin (FLOMAX) capsule 0.4 mg  0.4 mg Oral Daily Waqar Cash MD         PRN Meds:   Current Facility-Administered Medications   Medication Dose Route Frequency Provider Last Rate Last Admin    acetaminophen (TYLENOL) tablet 650 mg  650 mg Oral Q4H PRN Waqar Cash MD        Or    acetaminophen (TYLENOL) oral liquid 650 mg  650 mg Per NG tube Q4H PRN Waqar Cash MD        Or    acetaminophen (TYLENOL) Suppository 650 mg  650 mg Rectal Q4H PRN Waqar Cash MD        glucose gel 15-30 g  15-30 g Oral Q15 Min PRN Waqar Cash MD        Or    dextrose 50 %  injection 25-50 mL  25-50 mL Intravenous Q15 Min PRN Waqar Cash MD        Or    glucagon injection 1 mg  1 mg Subcutaneous Q15 Min PRN Waqar Cash MD        ondansetron (ZOFRAN ODT) ODT tab 4 mg  4 mg Oral Q6H PRN Waqar Cash MD        Or    ondansetron (ZOFRAN) injection 4 mg  4 mg Intravenous Q6H PRN Waqar Cash MD           Data   Recent Labs   Lab 05/11/24  0744 05/11/24  0124 05/10/24  1919   WBC  --   --  14.5*   HGB  --   --  13.9   MCV  --   --  89   PLT  --   --  217   INR  --   --  0.97   NA  --   --  139   POTASSIUM  --   --  3.1*   CHLORIDE  --   --  98   CO2  --   --  30*   BUN  --   --  17.3   CR  --   --  0.85   ANIONGAP  --   --  11   ULISES  --   --  9.8   * 148* 93   ALBUMIN  --   --  4.3   PROTTOTAL  --   --  7.3   BILITOTAL  --   --  0.4   ALKPHOS  --   --  122   ALT  --   --  24   AST  --   --  19       Recent Results (from the past 24 hour(s))   MR Brain w/o & w Contrast    Narrative    EXAM: MRA BRAIN ([place]) W/O CONTRAST, MR BRAIN W/O and W CONTRAST, MRA NECK (CAROTIDS) W/O and W CONTRAST  LOCATION: [facility]  DATE: 5/10/2024    INDICATION: double vision x 2days  COMPARISON: None.  CONTRAST: 10 mL Gadavist  TECHNIQUE:   1) Routine multiplanar multisequence head MRI without and with intravenous contrast.  2) 3D time-of-flight head MRA without intravenous contrast.  3) Neck MRA without and with IV contrast. Stenosis measurements made according to NASCET criteria unless otherwise specified.    FINDINGS:  HEAD MRI:  INTRACRANIAL CONTENTS: No acute or subacute infarct. No mass, acute hemorrhage, or extra-axial fluid collections. Scattered nonspecific T2/FLAIR hyperintensities within the cerebral white matter most consistent with mild chronic microvascular ischemic   change. Additional T2 signal abnormality is present in the ventral right medulla. Normal ventricles and sulci. Normal position of the  cerebellar tonsils. No pathologic contrast enhancement.    SELLA: No abnormality accounting for technique.    OSSEOUS STRUCTURES/SOFT TISSUES: Normal marrow signal. The major intracranial vascular flow voids are maintained.     ORBITS: Prior bilateral cataract surgery. Visualized portions of the orbits are otherwise unremarkable.     SINUSES/MASTOIDS: Mild mucosal thickening scattered about the paranasal sinuses. No middle ear or mastoid effusion.     HEAD MRA:   ANTERIOR CIRCULATION: No stenosis/occlusion, aneurysm, or high flow vascular malformation. Fetal origin of the right posterior cerebral artery from the anterior circulation.    POSTERIOR CIRCULATION: No stenosis/occlusion, aneurysm, or high flow vascular malformation. Dominant left vertebral artery supplies the basilar artery with a small right vertebral artery supplying the right posterior inferior cerebellar artery (PICA).     NECK MRA:   RIGHT CAROTID: No measurable stenosis or dissection.    LEFT CAROTID: No measurable stenosis or dissection.    VERTEBRAL ARTERIES: No focal stenosis or dissection. Dominant left and smaller right vertebral arteries.    AORTIC ARCH: Classic aortic arch anatomy with no significant stenosis at the origin of the great vessels.      Impression    IMPRESSION:  HEAD MRI:   1.  No acute or subacute ischemic change.  2.  No acute cranial process or abnormal enhancement.  3.  Mild chronic small vessel ischemic change.    HEAD MRA:   1.  No aneurysm, high flow AVM or significant stenosis identified.  2.  Variant [place] of [place] anatomy as above.    NECK MRA:  1.  No hemodynamically significant stenosis within the vessels of the neck.   MRA Angiogram Head w/o Contrast    Narrative    EXAM: MRA BRAIN ([place] OF [place]) W/O CONTRAST, MR BRAIN W/O and W CONTRAST, MRA NECK (CAROTIDS) W/O and W CONTRAST  LOCATION: [facility]  DATE: 5/10/2024    INDICATION: double vision x 2days  COMPARISON: None.  CONTRAST: 10  mL Gadavist  TECHNIQUE:   1) Routine multiplanar multisequence head MRI without and with intravenous contrast.  2) 3D time-of-flight head MRA without intravenous contrast.  3) Neck MRA without and with IV contrast. Stenosis measurements made according to NASCET criteria unless otherwise specified.    FINDINGS:  HEAD MRI:  INTRACRANIAL CONTENTS: No acute or subacute infarct. No mass, acute hemorrhage, or extra-axial fluid collections. Scattered nonspecific T2/FLAIR hyperintensities within the cerebral white matter most consistent with mild chronic microvascular ischemic   change. Additional T2 signal abnormality is present in the ventral right medulla. Normal ventricles and sulci. Normal position of the cerebellar tonsils. No pathologic contrast enhancement.    SELLA: No abnormality accounting for technique.    OSSEOUS STRUCTURES/SOFT TISSUES: Normal marrow signal. The major intracranial vascular flow voids are maintained.     ORBITS: Prior bilateral cataract surgery. Visualized portions of the orbits are otherwise unremarkable.     SINUSES/MASTOIDS: Mild mucosal thickening scattered about the paranasal sinuses. No middle ear or mastoid effusion.     HEAD MRA:   ANTERIOR CIRCULATION: No stenosis/occlusion, aneurysm, or high flow vascular malformation. Fetal origin of the right posterior cerebral artery from the anterior circulation.    POSTERIOR CIRCULATION: No stenosis/occlusion, aneurysm, or high flow vascular malformation. Dominant left vertebral artery supplies the basilar artery with a small right vertebral artery supplying the right posterior inferior cerebellar artery (PICA).     NECK MRA:   RIGHT CAROTID: No measurable stenosis or dissection.    LEFT CAROTID: No measurable stenosis or dissection.    VERTEBRAL ARTERIES: No focal stenosis or dissection. Dominant left and smaller right vertebral arteries.    AORTIC ARCH: Classic aortic arch anatomy with no significant stenosis at the origin of the great  vessels.      Impression    IMPRESSION:  HEAD MRI:   1.  No acute or subacute ischemic change.  2.  No acute cranial process or abnormal enhancement.  3.  Mild chronic small vessel ischemic change.    HEAD MRA:   1.  No aneurysm, high flow AVM or significant stenosis identified.  2.  Variant [place] of [place] anatomy as above.    NECK MRA:  1.  No hemodynamically significant stenosis within the vessels of the neck.   MRA Angiogram Neck w/o & w Contrast    Narrative    EXAM: MRA BRAIN ([place] OF [place]) W/O CONTRAST, MR BRAIN W/O and W CONTRAST, MRA NECK (CAROTIDS) W/O and W CONTRAST  LOCATION: [facility]  DATE: 5/10/2024    INDICATION: double vision x 2days  COMPARISON: None.  CONTRAST: 10 mL Gadavist  TECHNIQUE:   1) Routine multiplanar multisequence head MRI without and with intravenous contrast.  2) 3D time-of-flight head MRA without intravenous contrast.  3) Neck MRA without and with IV contrast. Stenosis measurements made according to NASCET criteria unless otherwise specified.    FINDINGS:  HEAD MRI:  INTRACRANIAL CONTENTS: No acute or subacute infarct. No mass, acute hemorrhage, or extra-axial fluid collections. Scattered nonspecific T2/FLAIR hyperintensities within the cerebral white matter most consistent with mild chronic microvascular ischemic   change. Additional T2 signal abnormality is present in the ventral right medulla. Normal ventricles and sulci. Normal position of the cerebellar tonsils. No pathologic contrast enhancement.    SELLA: No abnormality accounting for technique.    OSSEOUS STRUCTURES/SOFT TISSUES: Normal marrow signal. The major intracranial vascular flow voids are maintained.     ORBITS: Prior bilateral cataract surgery. Visualized portions of the orbits are otherwise unremarkable.     SINUSES/MASTOIDS: Mild mucosal thickening scattered about the paranasal sinuses. No middle ear or mastoid effusion.     HEAD MRA:   ANTERIOR CIRCULATION: No stenosis/occlusion,  aneurysm, or high flow vascular malformation. Fetal origin of the right posterior cerebral artery from the anterior circulation.    POSTERIOR CIRCULATION: No stenosis/occlusion, aneurysm, or high flow vascular malformation. Dominant left vertebral artery supplies the basilar artery with a small right vertebral artery supplying the right posterior inferior cerebellar artery (PICA).     NECK MRA:   RIGHT CAROTID: No measurable stenosis or dissection.    LEFT CAROTID: No measurable stenosis or dissection.    VERTEBRAL ARTERIES: No focal stenosis or dissection. Dominant left and smaller right vertebral arteries.    AORTIC ARCH: Classic aortic arch anatomy with no significant stenosis at the origin of the great vessels.      Impression    IMPRESSION:  HEAD MRI:   1.  No acute or subacute ischemic change.  2.  No acute cranial process or abnormal enhancement.  3.  Mild chronic small vessel ischemic change.    HEAD MRA:   1.  No aneurysm, high flow AVM or significant stenosis identified.  2.  Variant [place] of [place] anatomy as above.    NECK MRA:  1.  No hemodynamically significant stenosis within the vessels of the neck.

## 2024-05-11 NOTE — CONSULTS
"[facility]    Stroke Consult Note    Reason for Consult:  diplopia     Chief Complaint: Stroke Symptoms (Pt reports double vision in left eye on Wednesday with balance issues. Also complains or head heaviness.)       HPI  Javier Cohen is a 68 year old male with PMHx significant for HLD, HTN, MCCARTHY, cataracts, meibomian gland dysfunction, optic nerve drusen, pterygium, punctal stenosis, tobacco use. He presented to the ED 5/10 with a few days of binocular diplopia and frontal headache. He was given aspirin and plavix while awaiting MRI. MRI did not show any infarct or significant vessel stenosis. LDL is 69; A1c is 11.     Impression  Right sixth nerve palsy, likely due to uncontrolled diabetes     Recommendations   - no further stroke evaluation necessary   - close PCP/dietician follow up for diabetes management  - does not need plavix; can continue PTA rosuvastatin; can continue aspirin if there is an indication      Patient Follow-up    - in the next 1-2 week(s) with PCP  - ophthalmology at patient's earliest convenience     Thank you for this consult. No further stroke evaluation is recommended, so we will sign off. Please contact us with any additional questions.    Abby Barrios NP  Vascular Neurology    To page me or covering stroke neurology team member, click here: [facility]  Choose \"On Call\" tab at top, then select \"NEUROLOGY/ALL SITES\" from middle drop-down box, press Enter, then look for \"stroke\" or \"telestroke\" for your site.  _____________________________________________________    Clinically Significant Risk Factors Present on Admission        # Hypokalemia: Lowest K = 3.1 mmol/L in last 2 days, will replace as needed           # Hypertension: Home medication list includes antihypertensive(s)     # DMII: A1C = 11.0 % (Ref range: <5.7 %) within past 6 months    # Overweight: Estimated body mass index is 27.16 kg/m  as calculated from the following:    Height " "as of this encounter: 1.651 m (5' 5\").    Weight as of this encounter: 74 kg (163 lb 3.2 oz).              Past Medical History    Past Medical History:   Diagnosis Date    Alcohol abuse     Diabetes mellitus, type 2 (H)     Hypercholesteremia     Hypertension      Medications   Home Meds  Prior to Admission medications    Medication Sig Start Date End Date Taking? Authorizing Provider   albuterol (PROAIR HFA/PROVENTIL HFA/VENTOLIN HFA) 108 (90 Base) MCG/ACT inhaler Inhale 2 puffs into the lungs every 4 hours Use every 4 hours for the next 2 days, then if improving decrease to every 6 hours as needed only. 10/25/19   Coco Tran APRN CNP   folic acid (FOLVITE) 1 MG tablet Take 1 tablet (1 mg) by mouth daily 7/19/18   Adebayo Elizondo MD   HYDROCHLOROTHIAZIDE PO Take 25 mg by mouth daily    Unknown, Entered By History   lisinopril (PRINIVIL/ZESTRIL) 10 MG tablet Take 1 tablet (10 mg) by mouth daily 7/19/18   Adebayo Elizondo MD   metoprolol succinate (TOPROL-XL) 50 MG 24 hr tablet Take 1 tablet (50 mg) by mouth daily 7/19/18   Adebayo Elizondo MD   multivitamin, therapeutic with minerals (THERA-VIT-M) TABS tablet Take 1 tablet by mouth daily 7/19/18   Adebayo Elizondo MD   rosuvastatin (CRESTOR) 5 MG tablet Take 1 tablet (5 mg) by mouth daily 7/19/18   Adebayo Elizondo MD   tamsulosin (FLOMAX) 0.4 MG capsule Take 1 capsule (0.4 mg) by mouth daily 10/9/19   Asael Yost DO       Scheduled Meds  Current Facility-Administered Medications   Medication Dose Route Frequency Provider Last Rate Last Admin    aspirin EC tablet 81 mg  81 mg Oral Daily Waqar Cash MD   81 mg at 05/11/24 0136    Or    aspirin (ASA) chewable tablet 81 mg  81 mg Oral or NG Tube Daily Waqar Cash MD        clopidogrel (PLAVIX) tablet 75 mg  75 mg Oral or NG Tube Daily Waqar Cash MD        insulin aspart (NovoLOG) injection (RAPID ACTING)  1-7 " "Units Subcutaneous TID AC Waqar Cash MD        insulin aspart (NovoLOG) injection (RAPID ACTING)  1-5 Units Subcutaneous At Bedtime Waqar Cash MD        rosuvastatin (CRESTOR) tablet 5 mg  5 mg Oral Daily Waqar Cash MD        tamsulosin (FLOMAX) capsule 0.4 mg  0.4 mg Oral Daily Waqar Cash MD           Infusion Meds  Current Facility-Administered Medications   Medication Dose Route Frequency Provider Last Rate Last Admin       Allergies   Allergies   Allergen Reactions    Cashew Nut Oil Hives     Cashew nut oil ~\": burning inside\"  Burning inside      Corylus Difficulty breathing     nuts    Atorvastatin Unknown and GI Disturbance    Tree Nuts [Nuts]     Lisinopril Rash          PHYSICAL EXAMINATION   Temp:  [97.6  F (36.4  C)-97.9  F (36.6  C)] 97.6  F (36.4  C)  Pulse:  [77-88] 78  Resp:  [14-16] 14  BP: (151-169)/() 151/90  SpO2:  [95 %-99 %] 98 %    Neurologic  Mental Status:  alert, oriented x 3, follows commands, speech clear and fluent, naming and repetition normal  Cranial Nerves:  visual fields intact but right eye blurry, PERRL, EOMI with normal smooth pursuit, unable to abduct right eye, facial sensation intact and symmetric, facial movements symmetric, hearing not formally tested but intact to conversation, no dysarthria, shoulder shrug strong bilaterally, tongue protrusion midline  Motor:  normal muscle tone and bulk, no abnormal movements, able to move all limbs spontaneously, strength 5/5 throughout upper and lower extremities, no pronator drift  Sensory:  light touch sensation intact and symmetric throughout upper and lower extremities, no extinction on double simultaneous stimulation   Coordination:  normal finger-to-nose and heel-to-shin bilaterally without dysmetria  Station/Gait:  deferred    Imaging  I personally reviewed all imaging; relevant findings per HPI.    Labs Data   CBC  Recent Labs   Lab 05/10/24  1919   WBC 14.5*   RBC " 4.59   HGB 13.9   HCT 40.7        Basic Metabolic Panel   Recent Labs   Lab 05/11/24  0124 05/10/24  1919   NA  --  139   POTASSIUM  --  3.1*   CHLORIDE  --  98   CO2  --  30*   BUN  --  17.3   CR  --  0.85   * 93   ULISES  --  9.8     Liver Panel  Recent Labs   Lab 05/10/24  1919   PROTTOTAL 7.3   ALBUMIN 4.3   BILITOTAL 0.4   ALKPHOS 122   AST 19   ALT 24     INR    Recent Labs   Lab Test 05/10/24  1919   INR 0.97           Stroke Consult Data Data   This was a non-emergent, non-telestroke consult.  I have personally spent a total of 35 minutes providing care today, time spent in reviewing medical records and devising the plan as recorded above.

## 2024-05-13 LAB
BASOPHILS # BLD MANUAL: 0 10E3/UL (ref 0–0.2)
BASOPHILS NFR BLD MANUAL: 0 %
EOSINOPHIL # BLD MANUAL: 0.6 10E3/UL (ref 0–0.7)
EOSINOPHIL NFR BLD MANUAL: 4 %
LYMPHOCYTES # BLD MANUAL: 3.9 10E3/UL (ref 0.8–5.3)
LYMPHOCYTES NFR BLD MANUAL: 27 %
MONOCYTES # BLD MANUAL: 2.6 10E3/UL (ref 0–1.3)
MONOCYTES NFR BLD MANUAL: 18 %
NEUTROPHILS # BLD MANUAL: 7.4 10E3/UL (ref 1.6–8.3)
NEUTROPHILS NFR BLD MANUAL: 51 %
PATH REV: ABNORMAL
PLAT MORPH BLD: ABNORMAL
RBC MORPH BLD: ABNORMAL

## 2024-05-14 ENCOUNTER — APPOINTMENT (OUTPATIENT)
Dept: INTERPRETER SERVICES | Facility: CLINIC | Age: 69
End: 2024-05-14
Payer: COMMERCIAL

## 2024-09-16 ENCOUNTER — APPOINTMENT (OUTPATIENT)
Dept: ULTRASOUND IMAGING | Facility: CLINIC | Age: 69
End: 2024-09-16
Attending: EMERGENCY MEDICINE
Payer: COMMERCIAL

## 2024-09-16 ENCOUNTER — HOSPITAL ENCOUNTER (EMERGENCY)
Facility: CLINIC | Age: 69
Discharge: HOME | End: 2024-09-17
Attending: EMERGENCY MEDICINE | Admitting: EMERGENCY MEDICINE
Payer: COMMERCIAL

## 2024-09-16 DIAGNOSIS — K21.9 GASTROESOPHAGEAL REFLUX DISEASE, UNSPECIFIED WHETHER ESOPHAGITIS PRESENT: ICD-10-CM

## 2024-09-16 LAB
ALBUMIN SERPL BCG-MCNC: 4.7 G/DL (ref 3.5–5.2)
ALBUMIN UR-MCNC: 10 MG/DL
ALP SERPL-CCNC: 136 U/L (ref 40–150)
ALT SERPL W P-5'-P-CCNC: 25 U/L (ref 0–70)
ANION GAP SERPL CALCULATED.3IONS-SCNC: 8 MMOL/L (ref 7–15)
APPEARANCE UR: CLEAR
AST SERPL W P-5'-P-CCNC: 18 U/L (ref 0–45)
BASOPHILS # BLD AUTO: 0.1 10E3/UL (ref 0–0.2)
BASOPHILS NFR BLD AUTO: 1 %
BILIRUB SERPL-MCNC: 0.3 MG/DL
BILIRUB UR QL STRIP: NEGATIVE
BUN SERPL-MCNC: 12.5 MG/DL (ref 8–23)
CALCIUM SERPL-MCNC: 9.8 MG/DL (ref 8.8–10.4)
CHLORIDE SERPL-SCNC: 100 MMOL/L (ref 98–107)
COLOR UR AUTO: ABNORMAL
CREAT SERPL-MCNC: 0.91 MG/DL (ref 0.67–1.17)
EGFRCR SERPLBLD CKD-EPI 2021: 90 ML/MIN/1.73M2
EOSINOPHIL # BLD AUTO: 0.4 10E3/UL (ref 0–0.7)
EOSINOPHIL NFR BLD AUTO: 3 %
ERYTHROCYTE [DISTWIDTH] IN BLOOD BY AUTOMATED COUNT: 12.8 % (ref 10–15)
GLUCOSE SERPL-MCNC: 194 MG/DL (ref 70–99)
GLUCOSE UR STRIP-MCNC: NEGATIVE MG/DL
HCO3 SERPL-SCNC: 34 MMOL/L (ref 22–29)
HCT VFR BLD AUTO: 43.4 % (ref 40–53)
HGB BLD-MCNC: 15.1 G/DL (ref 13.3–17.7)
HGB UR QL STRIP: NEGATIVE
HYALINE CASTS: 1 /LPF
IMM GRANULOCYTES # BLD: 0 10E3/UL
IMM GRANULOCYTES NFR BLD: 0 %
KETONES UR STRIP-MCNC: NEGATIVE MG/DL
LEUKOCYTE ESTERASE UR QL STRIP: NEGATIVE
LIPASE SERPL-CCNC: 34 U/L (ref 13–60)
LYMPHOCYTES # BLD AUTO: 3.5 10E3/UL (ref 0.8–5.3)
LYMPHOCYTES NFR BLD AUTO: 28 %
MCH RBC QN AUTO: 30.6 PG (ref 26.5–33)
MCHC RBC AUTO-ENTMCNC: 34.8 G/DL (ref 31.5–36.5)
MCV RBC AUTO: 88 FL (ref 78–100)
MONOCYTES # BLD AUTO: 1.4 10E3/UL (ref 0–1.3)
MONOCYTES NFR BLD AUTO: 11 %
MUCOUS THREADS #/AREA URNS LPF: PRESENT /LPF
NEUTROPHILS # BLD AUTO: 7.1 10E3/UL (ref 1.6–8.3)
NEUTROPHILS NFR BLD AUTO: 57 %
NITRATE UR QL: NEGATIVE
NRBC # BLD AUTO: 0 10E3/UL
NRBC BLD AUTO-RTO: 0 /100
PH UR STRIP: 6.5 [PH] (ref 5–7)
PLATELET # BLD AUTO: 202 10E3/UL (ref 150–450)
POTASSIUM SERPL-SCNC: 3.2 MMOL/L (ref 3.4–5.3)
PROT SERPL-MCNC: 7.7 G/DL (ref 6.4–8.3)
RBC # BLD AUTO: 4.94 10E6/UL (ref 4.4–5.9)
RBC URINE: <1 /HPF
SODIUM SERPL-SCNC: 142 MMOL/L (ref 135–145)
SP GR UR STRIP: 1.01 (ref 1–1.03)
SQUAMOUS EPITHELIAL: <1 /HPF
UROBILINOGEN UR STRIP-MCNC: NORMAL MG/DL
WBC # BLD AUTO: 12.5 10E3/UL (ref 4–11)
WBC URINE: 1 /HPF

## 2024-09-16 PROCEDURE — 83690 ASSAY OF LIPASE: CPT | Performed by: EMERGENCY MEDICINE

## 2024-09-16 PROCEDURE — 83690 ASSAY OF LIPASE: CPT | Performed by: STUDENT IN AN ORGANIZED HEALTH CARE EDUCATION/TRAINING PROGRAM

## 2024-09-16 PROCEDURE — 85025 COMPLETE CBC W/AUTO DIFF WBC: CPT | Performed by: EMERGENCY MEDICINE

## 2024-09-16 PROCEDURE — 80053 COMPREHEN METABOLIC PANEL: CPT | Performed by: STUDENT IN AN ORGANIZED HEALTH CARE EDUCATION/TRAINING PROGRAM

## 2024-09-16 PROCEDURE — 80053 COMPREHEN METABOLIC PANEL: CPT | Performed by: EMERGENCY MEDICINE

## 2024-09-16 PROCEDURE — 81001 URINALYSIS AUTO W/SCOPE: CPT | Performed by: EMERGENCY MEDICINE

## 2024-09-16 PROCEDURE — 85004 AUTOMATED DIFF WBC COUNT: CPT | Performed by: STUDENT IN AN ORGANIZED HEALTH CARE EDUCATION/TRAINING PROGRAM

## 2024-09-16 PROCEDURE — 36415 COLL VENOUS BLD VENIPUNCTURE: CPT | Performed by: STUDENT IN AN ORGANIZED HEALTH CARE EDUCATION/TRAINING PROGRAM

## 2024-09-16 PROCEDURE — 76705 ECHO EXAM OF ABDOMEN: CPT

## 2024-09-16 PROCEDURE — 99284 EMERGENCY DEPT VISIT MOD MDM: CPT | Mod: 25

## 2024-09-16 ASSESSMENT — ACTIVITIES OF DAILY LIVING (ADL)
ADLS_ACUITY_SCORE: 36

## 2024-09-16 ASSESSMENT — COLUMBIA-SUICIDE SEVERITY RATING SCALE - C-SSRS
6. HAVE YOU EVER DONE ANYTHING, STARTED TO DO ANYTHING, OR PREPARED TO DO ANYTHING TO END YOUR LIFE?: NO
2. HAVE YOU ACTUALLY HAD ANY THOUGHTS OF KILLING YOURSELF IN THE PAST MONTH?: NO
1. IN THE PAST MONTH, HAVE YOU WISHED YOU WERE DEAD OR WISHED YOU COULD GO TO SLEEP AND NOT WAKE UP?: NO

## 2024-09-17 VITALS
RESPIRATION RATE: 12 BRPM | TEMPERATURE: 97.5 F | OXYGEN SATURATION: 98 % | DIASTOLIC BLOOD PRESSURE: 105 MMHG | HEART RATE: 72 BPM | SYSTOLIC BLOOD PRESSURE: 171 MMHG

## 2024-09-17 PROCEDURE — 250N000013 HC RX MED GY IP 250 OP 250 PS 637: Performed by: EMERGENCY MEDICINE

## 2024-09-17 RX ORDER — FAMOTIDINE 20 MG/1
20 TABLET, FILM COATED ORAL 2 TIMES DAILY
Qty: 60 TABLET | Refills: 0 | Status: SHIPPED | OUTPATIENT
Start: 2024-09-17 | End: 2024-10-17

## 2024-09-17 RX ORDER — MAGNESIUM HYDROXIDE/ALUMINUM HYDROXICE/SIMETHICONE 120; 1200; 1200 MG/30ML; MG/30ML; MG/30ML
15 SUSPENSION ORAL ONCE
Status: COMPLETED | OUTPATIENT
Start: 2024-09-17 | End: 2024-09-17

## 2024-09-17 RX ADMIN — ALUMINUM HYDROXIDE, MAGNESIUM HYDROXIDE, AND SIMETHICONE 15 ML: 200; 200; 20 SUSPENSION ORAL at 01:59

## 2024-09-17 ASSESSMENT — ACTIVITIES OF DAILY LIVING (ADL)
ADLS_ACUITY_SCORE: 36

## 2024-09-17 NOTE — ED TRIAGE NOTES
PT arrives via triage presenting with abdominal pain for 2 weeks. Pt states he was prescribed valacyclovir a week ago. Denies N/V, diarrhea, or constipation.      Triage Assessment (Adult)       Row Name 09/16/24 1949          Triage Assessment    Airway WDL WDL        Respiratory WDL    Respiratory WDL WDL        Skin Circulation/Temperature WDL    Skin Circulation/Temperature WDL WDL        Cardiac WDL    Cardiac WDL WDL        Peripheral/Neurovascular WDL    Peripheral Neurovascular WDL WDL        Cognitive/Neuro/Behavioral WDL    Cognitive/Neuro/Behavioral WDL WDL

## 2024-09-17 NOTE — ED PROVIDER NOTES
Emergency Department Note      History of Present Illness     Chief Complaint   Abdominal Pain    HPI   Javier Cohen is a 69 year old male with a history of diabetes mellitus, hypertension, and hyperlipidemia presenting with right sided abdominal pain that started two weeks ago. The patient notes his pain radiates to the back. The symptom is constant. Javier notes the pain is not worse after eating, and he is eating normally. He was evaluated for the pain and prescribed valacyclovir. He notes taking this medication and ibuprofen provides partial relief. Today, he notes feeling a bump in the abdomen. He otherwise endorses rash and numbness to the back. He denies nausea, vomiting, constipation, diarrhea, dysuria, or hematuria. No history of abdominal surgery.     Independent Historian   Daughter as detailed above. Family present at bedside provided Polish interpretation.     Review of External Notes   I reviewed the clinic note from 9/6/24.     Past Medical History     Medical History and Problem List   Type 2 diabetes mellitus   PVCs   Hyperlipidemia   Hypertension  Colon polyps   Diverticula of colon  Optic nerve drusen  Aquired punctal stenosis   Nuclear cataract   MGD   MCCARTHY      Medications  Hydrochlorothiazide  Lisinopril  Metoprolol succinate  Rosuvastatin  Tamsulosin   Metformin   Amlodipine   Arthritis of left knee      Surgical History   Right TKA revision   Excision pterygium with amniotic membrane, right    Physical Exam     Patient Vitals for the past 24 hrs:   BP Pulse Resp SpO2   09/17/24 0200 (!) 171/105 -- 12 98 %   09/17/24 0145 -- -- -- 96 %   09/17/24 0130 -- -- -- 95 %   09/17/24 0115 -- -- -- 92 %   09/17/24 0100 (!) 181/94 72 -- 95 %     Physical Exam  General: No acute distress  Head: No obvious trauma to head.  Ears, Nose, Throat:  External ears normal.  Nose normal.  No pharyngeal erythema, swelling or exudate.  Midline uvula. Moist mucus membranes.  Eyes:  Conjunctivae  clear.   Neck: Normal range of motion.  Neck supple.   CV: Regular rate and rhythm.  No murmurs.      Respiratory: Effort normal and breath sounds normal.  No wheezing or crackles.   Gastrointestinal: Soft.  No distension. RUQ tenderness to palpation. There is no rigidity, no rebound and no guarding.   Musculoskeletal: Normal range of motion.  Non tender extremities to palpations. No lower extremity edema  Neuro: Alert. Moving all extremities appropriately.  Normal speech.    Skin: Skin is warm and dry.  No rash noted.   Psych: Normal mood and affect. Behavior is normal.      Diagnostics     Lab Results   Labs Ordered and Resulted from Time of ED Arrival to Time of ED Departure   COMPREHENSIVE METABOLIC PANEL - Abnormal       Result Value    Sodium 142      Potassium 3.2 (*)     Carbon Dioxide (CO2) 34 (*)     Anion Gap 8      Urea Nitrogen 12.5      Creatinine 0.91      GFR Estimate 90      Calcium 9.8      Chloride 100      Glucose 194 (*)     Alkaline Phosphatase 136      AST 18      ALT 25      Protein Total 7.7      Albumin 4.7      Bilirubin Total 0.3     CBC WITH PLATELETS AND DIFFERENTIAL - Abnormal    WBC Count 12.5 (*)     RBC Count 4.94      Hemoglobin 15.1      Hematocrit 43.4      MCV 88      MCH 30.6      MCHC 34.8      RDW 12.8      Platelet Count 202      % Neutrophils 57      % Lymphocytes 28      % Monocytes 11      % Eosinophils 3      % Basophils 1      % Immature Granulocytes 0      NRBCs per 100 WBC 0      Absolute Neutrophils 7.1      Absolute Lymphocytes 3.5      Absolute Monocytes 1.4 (*)     Absolute Eosinophils 0.4      Absolute Basophils 0.1      Absolute Immature Granulocytes 0.0      Absolute NRBCs 0.0     ROUTINE UA WITH MICROSCOPIC REFLEX TO CULTURE - Abnormal    Color Urine Light Yellow      Appearance Urine Clear      Glucose Urine Negative      Bilirubin Urine Negative      Ketones Urine Negative      Specific Gravity Urine 1.014      Blood Urine Negative      pH Urine 6.5       Protein Albumin Urine 10 (*)     Urobilinogen Urine Normal      Nitrite Urine Negative      Leukocyte Esterase Urine Negative      Mucus Urine Present (*)     RBC Urine <1      WBC Urine 1      Squamous Epithelials Urine <1      Hyaline Casts Urine 1     LIPASE - Normal    Lipase 34       Imaging   US Abdomen Limited   Final Result   IMPRESSION:   1.  Normal limited abdominal ultrasound.              Independent Interpretation   None    ED Course      Medications Administered   Medications   alum & mag hydroxide-simethicone (MAALOX) suspension 15 mL (15 mLs Oral $Given 9/17/24 0159)     Procedures   None     Discussion of Management   None    ED Course   ED Course as of 09/17/24 2228   Mon Sep 16, 2024   2319 I obtained the history and examined the patient as noted above.      Tue Sep 17, 2024   0254 I rechecked and updated the patient. The patient has improved after receiving a GI cocktail.        Additional Documentation  None    Medical Decision Making / Diagnosis     CMS Diagnoses: None    MIPS       None    [facility]   Javier Cohen is a 69 year old male presenting with RUQ abdominal pain. He was prescribed valacyclovir for presumed shingles but his pain is not resolving. He has a mild leukocytosis. LFTs and lipase are within normal limits. RUQ US is obtained and shows no gallbladder or liver abnormalities. He is given a GI cocktail and afterward, reports his pain is improved. His pain is believed to be related to acid reflux. He is counseled about this and is given a prescriptio for famotidine. He is instructed to follow up with his PCP regarding his symptoms after starting this medication. Return precautions are given and he verbalizes understanding. He is discharged home in stable condition with his daughter.    Disposition   The patient was discharged.     Diagnosis     ICD-10-CM    1. Gastroesophageal reflux disease, unspecified whether esophagitis present  K21.9            Discharge Medications    Discharge Medication List as of 9/17/2024  3:04 AM        START taking these medications    Details   famotidine (PEPCID) 20 MG tablet Take 1 tablet (20 mg) by mouth 2 times daily., Disp-60 tablet, R-0, E-Prescribe           Scribe Disclosure:  Brook DANIEL, am serving as a scribe at 11:09 PM on 9/16/2024 to document services personally performed by Travis Granados MD based on my observations and the provider's statements to me.        Travis Granados MD  09/17/24 [id number]

## 2024-09-17 NOTE — ED NOTES
Discharge to home, review of discharge paperwork, Primary follow-up and all medications have been reviewed.  Patient and/or family questions have been answered.

## 2024-09-17 NOTE — DISCHARGE INSTRUCTIONS
Your ultrasound was normal and your blood work also appears reassuring.  Since your pain improved after receiving the GI cocktail, it seems likely that your pain is due to acid reflux.  We are giving you a medication to help decrease the acid production in your stomach.  We want you to follow-up with your primary care provider.  Please return the emergency department if you develop any new or concerning symptoms.